# Patient Record
Sex: MALE | Race: BLACK OR AFRICAN AMERICAN | NOT HISPANIC OR LATINO
[De-identification: names, ages, dates, MRNs, and addresses within clinical notes are randomized per-mention and may not be internally consistent; named-entity substitution may affect disease eponyms.]

---

## 2017-05-03 ENCOUNTER — RESULT REVIEW (OUTPATIENT)
Age: 67
End: 2017-05-03

## 2018-04-04 ENCOUNTER — EMERGENCY (EMERGENCY)
Facility: HOSPITAL | Age: 68
LOS: 1 days | Discharge: HOME | End: 2018-04-04
Attending: EMERGENCY MEDICINE
Payer: MEDICARE

## 2018-04-04 VITALS
DIASTOLIC BLOOD PRESSURE: 77 MMHG | HEART RATE: 60 BPM | TEMPERATURE: 98 F | SYSTOLIC BLOOD PRESSURE: 147 MMHG | OXYGEN SATURATION: 100 % | RESPIRATION RATE: 18 BRPM

## 2018-04-04 VITALS — HEIGHT: 62 IN | WEIGHT: 162.04 LBS

## 2018-04-04 DIAGNOSIS — T88.7XXA UNSPECIFIED ADVERSE EFFECT OF DRUG OR MEDICAMENT, INITIAL ENCOUNTER: ICD-10-CM

## 2018-04-04 DIAGNOSIS — F29 UNSPECIFIED PSYCHOSIS NOT DUE TO A SUBSTANCE OR KNOWN PHYSIOLOGICAL CONDITION: ICD-10-CM

## 2018-04-04 LAB
ALBUMIN SERPL ELPH-MCNC: 4.5 G/DL — SIGNIFICANT CHANGE UP (ref 3.3–5.2)
ALP SERPL-CCNC: 73 U/L — SIGNIFICANT CHANGE UP (ref 40–120)
ALT FLD-CCNC: 7 U/L — SIGNIFICANT CHANGE UP
AMPHET UR-MCNC: NEGATIVE — SIGNIFICANT CHANGE UP
ANION GAP SERPL CALC-SCNC: 11 MMOL/L — SIGNIFICANT CHANGE UP (ref 5–17)
APAP SERPL-MCNC: <7.5 UG/ML — LOW (ref 10–26)
APPEARANCE UR: CLEAR — SIGNIFICANT CHANGE UP
AST SERPL-CCNC: 13 U/L — SIGNIFICANT CHANGE UP
BACTERIA # UR AUTO: ABNORMAL
BARBITURATES UR SCN-MCNC: NEGATIVE — SIGNIFICANT CHANGE UP
BASOPHILS # BLD AUTO: 0 K/UL — SIGNIFICANT CHANGE UP (ref 0–0.2)
BASOPHILS NFR BLD AUTO: 0.2 % — SIGNIFICANT CHANGE UP (ref 0–2)
BENZODIAZ UR-MCNC: NEGATIVE — SIGNIFICANT CHANGE UP
BILIRUB SERPL-MCNC: 0.8 MG/DL — SIGNIFICANT CHANGE UP (ref 0.4–2)
BILIRUB UR-MCNC: NEGATIVE — SIGNIFICANT CHANGE UP
BUN SERPL-MCNC: 10 MG/DL — SIGNIFICANT CHANGE UP (ref 8–20)
CALCIUM SERPL-MCNC: 9.2 MG/DL — SIGNIFICANT CHANGE UP (ref 8.6–10.2)
CHLORIDE SERPL-SCNC: 102 MMOL/L — SIGNIFICANT CHANGE UP (ref 98–107)
CO2 SERPL-SCNC: 28 MMOL/L — SIGNIFICANT CHANGE UP (ref 22–29)
COCAINE METAB.OTHER UR-MCNC: NEGATIVE — SIGNIFICANT CHANGE UP
COLOR SPEC: YELLOW — SIGNIFICANT CHANGE UP
CREAT SERPL-MCNC: 0.79 MG/DL — SIGNIFICANT CHANGE UP (ref 0.5–1.3)
DIFF PNL FLD: ABNORMAL
EOSINOPHIL # BLD AUTO: 0.2 K/UL — SIGNIFICANT CHANGE UP (ref 0–0.5)
EOSINOPHIL NFR BLD AUTO: 4.8 % — SIGNIFICANT CHANGE UP (ref 0–5)
ETHANOL SERPL-MCNC: <10 MG/DL — SIGNIFICANT CHANGE UP
GLUCOSE SERPL-MCNC: 92 MG/DL — SIGNIFICANT CHANGE UP (ref 70–115)
GLUCOSE UR QL: NEGATIVE MG/DL — SIGNIFICANT CHANGE UP
HCT VFR BLD CALC: 42.3 % — SIGNIFICANT CHANGE UP (ref 42–52)
HGB BLD-MCNC: 13.7 G/DL — LOW (ref 14–18)
KETONES UR-MCNC: NEGATIVE — SIGNIFICANT CHANGE UP
LEUKOCYTE ESTERASE UR-ACNC: NEGATIVE — SIGNIFICANT CHANGE UP
LYMPHOCYTES # BLD AUTO: 1 K/UL — SIGNIFICANT CHANGE UP (ref 1–4.8)
LYMPHOCYTES # BLD AUTO: 24.9 % — SIGNIFICANT CHANGE UP (ref 20–55)
MCHC RBC-ENTMCNC: 31.6 PG — HIGH (ref 27–31)
MCHC RBC-ENTMCNC: 32.4 G/DL — SIGNIFICANT CHANGE UP (ref 32–36)
MCV RBC AUTO: 97.7 FL — HIGH (ref 80–94)
METHADONE UR-MCNC: NEGATIVE — SIGNIFICANT CHANGE UP
MONOCYTES # BLD AUTO: 0.4 K/UL — SIGNIFICANT CHANGE UP (ref 0–0.8)
MONOCYTES NFR BLD AUTO: 9.4 % — SIGNIFICANT CHANGE UP (ref 3–10)
NEUTROPHILS # BLD AUTO: 2.5 K/UL — SIGNIFICANT CHANGE UP (ref 1.8–8)
NEUTROPHILS NFR BLD AUTO: 60.7 % — SIGNIFICANT CHANGE UP (ref 37–73)
NITRITE UR-MCNC: NEGATIVE — SIGNIFICANT CHANGE UP
OPIATES UR-MCNC: NEGATIVE — SIGNIFICANT CHANGE UP
PCP SPEC-MCNC: SIGNIFICANT CHANGE UP
PCP UR-MCNC: NEGATIVE — SIGNIFICANT CHANGE UP
PH UR: 6.5 — SIGNIFICANT CHANGE UP (ref 5–8)
PLATELET # BLD AUTO: 180 K/UL — SIGNIFICANT CHANGE UP (ref 150–400)
POTASSIUM SERPL-MCNC: 4.3 MMOL/L — SIGNIFICANT CHANGE UP (ref 3.5–5.3)
POTASSIUM SERPL-SCNC: 4.3 MMOL/L — SIGNIFICANT CHANGE UP (ref 3.5–5.3)
PROT SERPL-MCNC: 7 G/DL — SIGNIFICANT CHANGE UP (ref 6.6–8.7)
PROT UR-MCNC: 15 MG/DL
RBC # BLD: 4.33 M/UL — LOW (ref 4.6–6.2)
RBC # FLD: 12.2 % — SIGNIFICANT CHANGE UP (ref 11–15.6)
RBC CASTS # UR COMP ASSIST: SIGNIFICANT CHANGE UP /HPF (ref 0–4)
SALICYLATES SERPL-MCNC: <0.6 MG/DL — LOW (ref 10–20)
SODIUM SERPL-SCNC: 141 MMOL/L — SIGNIFICANT CHANGE UP (ref 135–145)
SP GR SPEC: 1.01 — SIGNIFICANT CHANGE UP (ref 1.01–1.02)
THC UR QL: NEGATIVE — SIGNIFICANT CHANGE UP
TSH SERPL-MCNC: 0.44 UIU/ML — SIGNIFICANT CHANGE UP (ref 0.27–4.2)
UROBILINOGEN FLD QL: 1 MG/DL
WBC # BLD: 4.2 K/UL — LOW (ref 4.8–10.8)
WBC # FLD AUTO: 4.2 K/UL — LOW (ref 4.8–10.8)

## 2018-04-04 PROCEDURE — 96374 THER/PROPH/DIAG INJ IV PUSH: CPT

## 2018-04-04 PROCEDURE — 85027 COMPLETE CBC AUTOMATED: CPT

## 2018-04-04 PROCEDURE — 84443 ASSAY THYROID STIM HORMONE: CPT

## 2018-04-04 PROCEDURE — 80053 COMPREHEN METABOLIC PANEL: CPT

## 2018-04-04 PROCEDURE — 99284 EMERGENCY DEPT VISIT MOD MDM: CPT

## 2018-04-04 PROCEDURE — 70450 CT HEAD/BRAIN W/O DYE: CPT

## 2018-04-04 PROCEDURE — 81001 URINALYSIS AUTO W/SCOPE: CPT

## 2018-04-04 PROCEDURE — 90792 PSYCH DIAG EVAL W/MED SRVCS: CPT

## 2018-04-04 PROCEDURE — 80307 DRUG TEST PRSMV CHEM ANLYZR: CPT

## 2018-04-04 PROCEDURE — 36415 COLL VENOUS BLD VENIPUNCTURE: CPT

## 2018-04-04 PROCEDURE — 96376 TX/PRO/DX INJ SAME DRUG ADON: CPT

## 2018-04-04 PROCEDURE — 70450 CT HEAD/BRAIN W/O DYE: CPT | Mod: 26

## 2018-04-04 PROCEDURE — 99284 EMERGENCY DEPT VISIT MOD MDM: CPT | Mod: 25

## 2018-04-04 RX ORDER — LOSARTAN POTASSIUM 100 MG/1
25 TABLET, FILM COATED ORAL DAILY
Qty: 0 | Refills: 0 | Status: DISCONTINUED | OUTPATIENT
Start: 2018-04-04 | End: 2018-04-04

## 2018-04-04 RX ORDER — AMLODIPINE BESYLATE 2.5 MG/1
10 TABLET ORAL ONCE
Qty: 0 | Refills: 0 | Status: COMPLETED | OUTPATIENT
Start: 2018-04-04 | End: 2018-04-04

## 2018-04-04 RX ORDER — HYDROCHLOROTHIAZIDE 25 MG
25 TABLET ORAL ONCE
Qty: 0 | Refills: 0 | Status: COMPLETED | OUTPATIENT
Start: 2018-04-04 | End: 2018-04-04

## 2018-04-04 RX ORDER — LABETALOL HCL 100 MG
10 TABLET ORAL ONCE
Qty: 0 | Refills: 0 | Status: COMPLETED | OUTPATIENT
Start: 2018-04-04 | End: 2018-04-04

## 2018-04-04 RX ORDER — QUETIAPINE FUMARATE 200 MG/1
0.5 TABLET, FILM COATED ORAL
Qty: 10 | Refills: 0 | OUTPATIENT
Start: 2018-04-04 | End: 2018-04-13

## 2018-04-04 RX ORDER — LOSARTAN POTASSIUM 100 MG/1
100 TABLET, FILM COATED ORAL ONCE
Qty: 0 | Refills: 0 | Status: COMPLETED | OUTPATIENT
Start: 2018-04-04 | End: 2018-04-04

## 2018-04-04 RX ORDER — LISINOPRIL 2.5 MG/1
20 TABLET ORAL ONCE
Qty: 0 | Refills: 0 | Status: COMPLETED | OUTPATIENT
Start: 2018-04-04 | End: 2018-04-04

## 2018-04-04 RX ADMIN — AMLODIPINE BESYLATE 10 MILLIGRAM(S): 2.5 TABLET ORAL at 10:42

## 2018-04-04 RX ADMIN — Medication 10 MILLIGRAM(S): at 10:05

## 2018-04-04 RX ADMIN — LISINOPRIL 20 MILLIGRAM(S): 2.5 TABLET ORAL at 10:42

## 2018-04-04 RX ADMIN — Medication 10 MILLIGRAM(S): at 09:27

## 2018-04-04 RX ADMIN — LOSARTAN POTASSIUM 25 MILLIGRAM(S): 100 TABLET, FILM COATED ORAL at 12:16

## 2018-04-04 RX ADMIN — Medication 25 MILLIGRAM(S): at 12:15

## 2018-04-04 RX ADMIN — LOSARTAN POTASSIUM 100 MILLIGRAM(S): 100 TABLET, FILM COATED ORAL at 09:28

## 2018-04-04 NOTE — ED ADULT NURSE NOTE - OBJECTIVE STATEMENT
Pt brought to ED by family for visual hallucinations. As per wife, pt has hx of parkinson's disease and does have hallucinations at baseline but wife states his hallucinations have worsened. Per wife,  pt is now seeing people in the house, pt ask wife "where is my machete?"  Pt denies SI/HI wife and daughter with pt.  Pt is A & OX4 and is calm at this time. Respirations are even & unlabored.

## 2018-04-04 NOTE — ED BEHAVIORAL HEALTH ASSESSMENT NOTE - DESCRIPTION (FIRST USE, LAST USE, QUANTITY, FREQUENCY, DURATION)
Drinks occasionally socially intermittedn marijuana use--last time was 3 months ago.  Stopped smoking for 30 years while working.  First used age 14

## 2018-04-04 NOTE — ED BEHAVIORAL HEALTH ASSESSMENT NOTE - RISK ASSESSMENT
Concerning patient's suicide risk, I feel the patient's current risk is low, , as evidenced by  denial of immediate ideation or intent, no history of suicide attempts, no current drinking, a sincere interest in getting help and the genuineness of patients safety anthony without demonstrable ambivalence, patient has good support and remains future oriented. Family feels safe with patient at home.  Patient visual hallucinations and potential for worsening course increase in long term risk.

## 2018-04-04 NOTE — ED PROVIDER NOTE - NEUROLOGICAL, MLM
Alert and oriented, no focal deficits, no motor or sensory deficits Alert and oriented X 3, no focal deficits, no motor or sensory deficits

## 2018-04-04 NOTE — ED ADULT TRIAGE NOTE - CHIEF COMPLAINT QUOTE
pt presents to ED with visual hallucinations. as per wife, pt has hx of parkinson's disease and has hallucinations at baseline but has got worse. pt is now seeing people in the house, pt ask wife "where is my machete?" denies SI. wife with pt. pt calm at this time.

## 2018-04-04 NOTE — ED PROVIDER NOTE - OBJECTIVE STATEMENT
66 y/o M pt with hx of Parkinson's, HTN and thyroidectomy presents to ED with wife and daughter c/o worsening visual hallucinations. Pt states he is seeing a man in his house and his daughter reports he has been asking his machete.  Pt's family states he has been having hallucinations for the past 6 months, but has worsening over the last month. His neurologist increased his Sinemet to 5 times per day. When family presented medications, he is also on generic form of sinemet once a day. Daughter states pt has not been sleeping and has been yelling at his wife during these episodes of hallucinations. Denies SI/HI, CP, SOB, fever, chills, nausea and vomiting. No further complaints at this time.

## 2018-04-04 NOTE — ED BEHAVIORAL HEALTH ASSESSMENT NOTE - HPI (INCLUDE ILLNESS QUALITY, SEVERITY, DURATION, TIMING, CONTEXT, MODIFYING FACTORS, ASSOCIATED SIGNS AND SYMPTOMS)
Patient is a 67 year old, female; domiciled with wife of many years and youngest daughter; noncaregiver; retired with no significant past psychiatric history; no psychiatric  hospitalizations; no known suicide attempts; no known history of violence or arrests; no active substance abuse or known history of complicated withdrawal; PMH of Parkinsons disease; brought in by family after having hallucinations this morning.      Patient has been having worsening hallucinations for the last couple of months.  Patient's sinemet has also been increased in January.   Patient denies any other stressors, other than hallucinations.  He characterizes his relationship with wife and family as "good".  Today patient woke up feeling as if there was an intruder in his room that wanted to stab him with a knife.  Patient yelled for his wife who reassured him that he was having a hallucination.  Patient admits that he has had similar episodes recently.  He alternatively agreed that it was a hallucination and or believed that his house was being invaded by spirits.  He has not been aggressive or violent to anyone.  He reports that spirits make him feel depressed.  But denies consistently depressed mood. He has othewise been sleeping, eating and concentration has been good.  He is AOx3.  He knows that he is in [facility] and that Santosh Lozoya in president.  Family noted that patient was angry this am, but he is currently doing well. Patient has never engaged in any dangerous or impulsive behavior and they feel safe with pat at home. Family has reportedly discussed matter with neurologist who did not change medications last time he saw patient, but family feel his symptoms are worse since last visit.       Concerning other psychiatric symptoms, pt denies ddenies any suicidal ideation, intent or plan as well as any aggressive or violent behavior towards others. Pt denies any episodes of bizarre happiness, unusual energy, unusual nightime excitation or other common symptoms of be. Pt denies hearing voices.  Patient admits that spirits make him anxious but otherwise denies any  panic attacks, obsessions or compulsions.      Family are at bedside. The corrobate history gathered above and feel safe with patient at home.  Daughter reports that she had discussed possiblity of medication induced hallucinations and possible addition of neuplazid with neurologist but that neurologist did not make any changes during last visit.  No overt personality change or excessive cognitive decline were noted by family.  Support and psychoeducation was discussed with family.

## 2018-04-04 NOTE — ED BEHAVIORAL HEALTH ASSESSMENT NOTE - OTHER PAST PSYCHIATRIC HISTORY (INCLUDE DETAILS REGARDING ONSET, COURSE OF ILLNESS, INPATIENT/OUTPATIENT TREATMENT)
Pt denies any prior history of formal psychiatric treatment.  He has no prior inpatient hospitalization.  No prior h/o outpatient treatment and no h/o psychotropic medications.  No prior suicide attempts.

## 2018-04-04 NOTE — ED BEHAVIORAL HEALTH ASSESSMENT NOTE - SUMMARY
Patient with visual hallucinations likely induced by medications (increase in sinemet)  Patient does not have criteria for major depression or be, denies any S/H I/I/P. Patient has not been aggressive or violent and family feel safe with patient at home. Pt's condition does not warrant inpatient hospitalization.  Would follow up and coordinate care with neurologist. Would recommend decreasing dosage of sinemet as possible. Would consider addition of Pimavanserin to target psychotic sx's if this is not possible. Both of these can be done as outpatient

## 2018-04-04 NOTE — ED BEHAVIORAL HEALTH ASSESSMENT NOTE - DESCRIPTION
Patient was laying in bed, calm, cooperative and no acute distress.  Patient did have coarse "pill rolling" tremor.      Vital Signs Last 24 Hrs  T(C): 37.1 (04 Apr 2018 11:22), Max: 37.1 (04 Apr 2018 11:22)  T(F): 98.8 (04 Apr 2018 11:22), Max: 98.8 (04 Apr 2018 11:22)  HR: 60 (04 Apr 2018 13:12) (60 - 85)  BP: 186/85 (04 Apr 2018 13:12) (186/85 - 226/137)  BP(mean): --  RR: 17 (04 Apr 2018 13:12) (17 - 18)  SpO2: 98% (04 Apr 2018 13:12) (98% - 100%) Parkinsons Worked 30 years in [employer].  Has been  50 years.  He has two daughters.  Currently retired

## 2018-04-04 NOTE — ED PROVIDER NOTE - MEDICAL DECISION MAKING DETAILS
Pt currently aaox3, well appearing, lucid and no hallucinations. Discussed case with both psych as well as with neuro Dr. Flood. PHe advises that sinemet is morderate dose and will see pt in office wihtin next few days to adjust. No precipitating factors identified here in ED - no infection, dehydration, anticholinergic meds. Family is agreeable with dc. Will give low dose seroquel for hallucinations until seen by neuro. Stable for dc

## 2018-04-04 NOTE — ED PROVIDER NOTE - CARE PLAN
Principal Discharge DX:	Parkinson disease  Secondary Diagnosis:	Psychosis, unspecified psychosis type

## 2018-04-04 NOTE — ED ADULT NURSE REASSESSMENT NOTE - NS ED NURSE REASSESS COMMENT FT1
Pt ambulatory to the bathroom.
Pt medicated for HBP as per MD orders, pt tolerated well, family remains at bedside.
Pt returned from CT, BP remains high, MD made aware.
Psych MD at bedside

## 2018-04-28 ENCOUNTER — INPATIENT (INPATIENT)
Facility: HOSPITAL | Age: 68
LOS: 4 days | Discharge: NURSING, REHAB OR LONG-TERM CARE | DRG: 683 | End: 2018-05-03
Attending: INTERNAL MEDICINE | Admitting: INTERNAL MEDICINE
Payer: MEDICARE

## 2018-04-28 VITALS
TEMPERATURE: 98 F | DIASTOLIC BLOOD PRESSURE: 53 MMHG | OXYGEN SATURATION: 97 % | HEIGHT: 66 IN | HEART RATE: 90 BPM | RESPIRATION RATE: 18 BRPM | WEIGHT: 160.06 LBS | SYSTOLIC BLOOD PRESSURE: 86 MMHG

## 2018-04-28 DIAGNOSIS — N17.9 ACUTE KIDNEY FAILURE, UNSPECIFIED: ICD-10-CM

## 2018-04-28 LAB
ALBUMIN SERPL ELPH-MCNC: 4 G/DL — SIGNIFICANT CHANGE UP (ref 3.3–5.2)
ALP SERPL-CCNC: 62 U/L — SIGNIFICANT CHANGE UP (ref 40–120)
ALT FLD-CCNC: 19 U/L — SIGNIFICANT CHANGE UP
ANION GAP SERPL CALC-SCNC: 14 MMOL/L — SIGNIFICANT CHANGE UP (ref 5–17)
ANISOCYTOSIS BLD QL: SLIGHT — SIGNIFICANT CHANGE UP
APPEARANCE UR: CLEAR — SIGNIFICANT CHANGE UP
AST SERPL-CCNC: 303 U/L — HIGH
BACTERIA # UR AUTO: ABNORMAL
BILIRUB SERPL-MCNC: 1 MG/DL — SIGNIFICANT CHANGE UP (ref 0.4–2)
BILIRUB UR-MCNC: NEGATIVE — SIGNIFICANT CHANGE UP
BUN SERPL-MCNC: 34 MG/DL — HIGH (ref 8–20)
CALCIUM SERPL-MCNC: 8.8 MG/DL — SIGNIFICANT CHANGE UP (ref 8.6–10.2)
CHLORIDE SERPL-SCNC: 101 MMOL/L — SIGNIFICANT CHANGE UP (ref 98–107)
CO2 SERPL-SCNC: 27 MMOL/L — SIGNIFICANT CHANGE UP (ref 22–29)
COLOR SPEC: YELLOW — SIGNIFICANT CHANGE UP
CREAT SERPL-MCNC: 3.29 MG/DL — HIGH (ref 0.5–1.3)
DIFF PNL FLD: ABNORMAL
EPI CELLS # UR: SIGNIFICANT CHANGE UP
GLUCOSE SERPL-MCNC: 90 MG/DL — SIGNIFICANT CHANGE UP (ref 70–115)
GLUCOSE UR QL: 50 MG/DL
HCT VFR BLD CALC: 43.6 % — SIGNIFICANT CHANGE UP (ref 42–52)
HGB BLD-MCNC: 14.5 G/DL — SIGNIFICANT CHANGE UP (ref 14–18)
HYPOCHROMIA BLD QL: SLIGHT — SIGNIFICANT CHANGE UP
KETONES UR-MCNC: ABNORMAL
LEUKOCYTE ESTERASE UR-ACNC: ABNORMAL
LYMPHOCYTES # BLD AUTO: 12 % — LOW (ref 20–55)
MACROCYTES BLD QL: SLIGHT — SIGNIFICANT CHANGE UP
MCHC RBC-ENTMCNC: 32.3 PG — HIGH (ref 27–31)
MCHC RBC-ENTMCNC: 33.3 G/DL — SIGNIFICANT CHANGE UP (ref 32–36)
MCV RBC AUTO: 97.1 FL — HIGH (ref 80–94)
MICROCYTES BLD QL: SLIGHT — SIGNIFICANT CHANGE UP
MONOCYTES NFR BLD AUTO: 8 % — SIGNIFICANT CHANGE UP (ref 3–10)
NEUTROPHILS NFR BLD AUTO: 76 % — HIGH (ref 37–73)
NITRITE UR-MCNC: NEGATIVE — SIGNIFICANT CHANGE UP
PH UR: 7 — SIGNIFICANT CHANGE UP (ref 5–8)
PLAT MORPH BLD: NORMAL — SIGNIFICANT CHANGE UP
PLATELET # BLD AUTO: 173 K/UL — SIGNIFICANT CHANGE UP (ref 150–400)
POIKILOCYTOSIS BLD QL AUTO: SLIGHT — SIGNIFICANT CHANGE UP
POTASSIUM SERPL-MCNC: 4.4 MMOL/L — SIGNIFICANT CHANGE UP (ref 3.5–5.3)
POTASSIUM SERPL-SCNC: 4.4 MMOL/L — SIGNIFICANT CHANGE UP (ref 3.5–5.3)
PROT SERPL-MCNC: 6.8 G/DL — SIGNIFICANT CHANGE UP (ref 6.6–8.7)
PROT UR-MCNC: 100 MG/DL
RBC # BLD: 4.49 M/UL — LOW (ref 4.6–6.2)
RBC # FLD: 12.3 % — SIGNIFICANT CHANGE UP (ref 11–15.6)
RBC BLD AUTO: ABNORMAL
RBC CASTS # UR COMP ASSIST: >50 /HPF (ref 0–4)
SODIUM SERPL-SCNC: 142 MMOL/L — SIGNIFICANT CHANGE UP (ref 135–145)
SP GR SPEC: 1.01 — SIGNIFICANT CHANGE UP (ref 1.01–1.02)
UROBILINOGEN FLD QL: 1 MG/DL
VARIANT LYMPHS # BLD: 4 % — SIGNIFICANT CHANGE UP (ref 0–6)
WBC # BLD: 11.7 K/UL — HIGH (ref 4.8–10.8)
WBC # FLD AUTO: 11.7 K/UL — HIGH (ref 4.8–10.8)
WBC UR QL: >50

## 2018-04-28 PROCEDURE — 93971 EXTREMITY STUDY: CPT | Mod: 26,RT

## 2018-04-28 PROCEDURE — 99285 EMERGENCY DEPT VISIT HI MDM: CPT

## 2018-04-28 PROCEDURE — 70486 CT MAXILLOFACIAL W/O DYE: CPT | Mod: 26

## 2018-04-28 PROCEDURE — 70450 CT HEAD/BRAIN W/O DYE: CPT | Mod: 26

## 2018-04-28 PROCEDURE — 76775 US EXAM ABDO BACK WALL LIM: CPT | Mod: 26

## 2018-04-28 PROCEDURE — 99223 1ST HOSP IP/OBS HIGH 75: CPT | Mod: AI

## 2018-04-28 RX ORDER — CARBIDOPA AND LEVODOPA 25; 100 MG/1; MG/1
1 TABLET ORAL THREE TIMES A DAY
Qty: 0 | Refills: 0 | Status: DISCONTINUED | OUTPATIENT
Start: 2018-04-28 | End: 2018-05-03

## 2018-04-28 RX ORDER — SODIUM CHLORIDE 9 MG/ML
3 INJECTION INTRAMUSCULAR; INTRAVENOUS; SUBCUTANEOUS ONCE
Qty: 0 | Refills: 0 | Status: COMPLETED | OUTPATIENT
Start: 2018-04-28 | End: 2018-04-28

## 2018-04-28 RX ORDER — PIMAVANSERIN TARTRATE 10 MG/1
34 TABLET, COATED ORAL DAILY
Qty: 0 | Refills: 0 | Status: DISCONTINUED | OUTPATIENT
Start: 2018-04-28 | End: 2018-05-03

## 2018-04-28 RX ORDER — SODIUM CHLORIDE 9 MG/ML
500 INJECTION INTRAMUSCULAR; INTRAVENOUS; SUBCUTANEOUS ONCE
Qty: 0 | Refills: 0 | Status: COMPLETED | OUTPATIENT
Start: 2018-04-28 | End: 2018-04-28

## 2018-04-28 RX ORDER — TAMSULOSIN HYDROCHLORIDE 0.4 MG/1
0.8 CAPSULE ORAL AT BEDTIME
Qty: 0 | Refills: 0 | Status: DISCONTINUED | OUTPATIENT
Start: 2018-04-28 | End: 2018-05-03

## 2018-04-28 RX ORDER — MIRABEGRON 50 MG/1
25 TABLET, EXTENDED RELEASE ORAL
Qty: 0 | Refills: 0 | Status: DISCONTINUED | OUTPATIENT
Start: 2018-04-28 | End: 2018-05-01

## 2018-04-28 RX ORDER — HEPARIN SODIUM 5000 [USP'U]/ML
5000 INJECTION INTRAVENOUS; SUBCUTANEOUS EVERY 12 HOURS
Qty: 0 | Refills: 0 | Status: DISCONTINUED | OUTPATIENT
Start: 2018-04-28 | End: 2018-05-03

## 2018-04-28 RX ORDER — FINASTERIDE 5 MG/1
5 TABLET, FILM COATED ORAL DAILY
Qty: 0 | Refills: 0 | Status: DISCONTINUED | OUTPATIENT
Start: 2018-04-28 | End: 2018-05-03

## 2018-04-28 RX ORDER — SODIUM CHLORIDE 9 MG/ML
1000 INJECTION INTRAMUSCULAR; INTRAVENOUS; SUBCUTANEOUS
Qty: 0 | Refills: 0 | Status: DISCONTINUED | OUTPATIENT
Start: 2018-04-28 | End: 2018-04-28

## 2018-04-28 RX ORDER — SODIUM CHLORIDE 9 MG/ML
1000 INJECTION INTRAMUSCULAR; INTRAVENOUS; SUBCUTANEOUS
Qty: 0 | Refills: 0 | Status: DISCONTINUED | OUTPATIENT
Start: 2018-04-28 | End: 2018-05-03

## 2018-04-28 RX ORDER — SODIUM CHLORIDE 9 MG/ML
1000 INJECTION INTRAMUSCULAR; INTRAVENOUS; SUBCUTANEOUS ONCE
Qty: 0 | Refills: 0 | Status: COMPLETED | OUTPATIENT
Start: 2018-04-28 | End: 2018-04-28

## 2018-04-28 RX ADMIN — SODIUM CHLORIDE 2000 MILLILITER(S): 9 INJECTION INTRAMUSCULAR; INTRAVENOUS; SUBCUTANEOUS at 11:02

## 2018-04-28 RX ADMIN — TAMSULOSIN HYDROCHLORIDE 0.8 MILLIGRAM(S): 0.4 CAPSULE ORAL at 21:15

## 2018-04-28 RX ADMIN — CARBIDOPA AND LEVODOPA 1 TABLET(S): 25; 100 TABLET ORAL at 21:15

## 2018-04-28 RX ADMIN — SODIUM CHLORIDE 1000 MILLILITER(S): 9 INJECTION INTRAMUSCULAR; INTRAVENOUS; SUBCUTANEOUS at 07:05

## 2018-04-28 RX ADMIN — SODIUM CHLORIDE 125 MILLILITER(S): 9 INJECTION INTRAMUSCULAR; INTRAVENOUS; SUBCUTANEOUS at 14:32

## 2018-04-28 RX ADMIN — PIMAVANSERIN TARTRATE 34 MILLIGRAM(S): 10 TABLET, COATED ORAL at 21:14

## 2018-04-28 RX ADMIN — SODIUM CHLORIDE 3 MILLILITER(S): 9 INJECTION INTRAMUSCULAR; INTRAVENOUS; SUBCUTANEOUS at 06:20

## 2018-04-28 RX ADMIN — CARBIDOPA AND LEVODOPA 1 TABLET(S): 25; 100 TABLET ORAL at 14:30

## 2018-04-28 RX ADMIN — FINASTERIDE 5 MILLIGRAM(S): 5 TABLET, FILM COATED ORAL at 14:30

## 2018-04-28 RX ADMIN — SODIUM CHLORIDE 125 MILLILITER(S): 9 INJECTION INTRAMUSCULAR; INTRAVENOUS; SUBCUTANEOUS at 21:16

## 2018-04-28 NOTE — ED ADULT TRIAGE NOTE - CHIEF COMPLAINT QUOTE
patient biba from home states that he has swollen legs and has not been able to walk since yesterday, wife states that patient was found on the floor yesterday by his wife, she states that he was stiff. patient denies any other complaints of pain but states that he does have pain to his right eye, swollen and tearing. Wife is unsure if he hit his eye when he fell yesterday. Denies being on any blood thinners at this time patient has a history of parkinsons, denies feeling dizzy patient biba from home states that he has swollen legs and has not been able to walk since yesterday, wife states that patient was found on the floor yesterday by his wife, she states that he was stiff. patient able to minimally move legs, pos sensory and pulses. patient denies any other complaints of pain but states that he does have pain to his right eye, swollen and tearing. Wife is unsure if he hit his eye when he fell yesterday. Denies being on any blood thinners at this time patient has a history of parkinsons, denies feeling dizzy

## 2018-04-28 NOTE — H&P ADULT - HISTORY OF PRESENT ILLNESS
The patient is a 67 year old male with a history of Parkinson's disease, hypertension and bph who was brought to the ER for weakness and a fall. According to the patient and his wife at the bedside. The patient is his usual state of health until 2 days ago. He tried to get out of bed at night to use the bathroom but fell over the side of the bed. He was unable to get up on his own and lay there all night. The next morning his wife awoke to find him curled up in a ball. He was awake but very weak complaining ofbilateral leg pain. She was unable to get him up on her own and waited for her daughter. Since then he has felt very weak with poor oral intake. In the ER he was noted to have naga and rhabdomyolysis.

## 2018-04-28 NOTE — ED PROVIDER NOTE - PROGRESS NOTE DETAILS
Ever: received sign out, patient noted with new acute renal failure with urinary retention (500cc on bladder scan); no hydro on renal US; thrasher placed, urine sent, grossly appears very tea colored; on further questioning, wife unsure how long he was on ground when he fell yesterday; added on CK to previous labs, noted to be elevated; renal failure with rhabdo, will admit to hospitalist

## 2018-04-28 NOTE — H&P ADULT - ASSESSMENT
The patient is a 67 year old male with a history of Parkinson's disease, hypertension and bph who was brought to the ER for weakness and a fall.  Admitted for acute renal failure and rhabdomyolysis.     Assessment/Plan:    1. DOMONIQUE: Baseline creatine normal in 04/2018- 0.79; Renal ultrasound with no hydronephrosis. Ramos placed fo i&Os.   Monitor bmp closely  Aggressive IV hydration    2. Rhabdomyolosis from fall causing lower extremity pain/cramping: IV hydration. monitor ck    3. Hypertension: hold cozaar for domonique and hypovolemia  Continue Myrbetric    4. Parkinson's disease; progressively worsening over the past few months. Continue Sinemet  On Nyplazid for hallucinations    Pt evaluation- [place] vs home with home care    VTE_ heparin subcut    Plan discused in detail with patient;s daughter and wife at the bedside

## 2018-04-28 NOTE — ED ADULT NURSE NOTE - CHIEF COMPLAINT QUOTE
patient biba from home states that he has swollen legs and has not been able to walk since yesterday, wife states that patient was found on the floor yesterday by his wife, she states that he was stiff. patient able to minimally move legs, pos sensory and pulses. patient denies any other complaints of pain but states that he does have pain to his right eye, swollen and tearing. Wife is unsure if he hit his eye when he fell yesterday. Denies being on any blood thinners at this time patient has a history of parkinsons, denies feeling dizzy

## 2018-04-28 NOTE — ED ADULT NURSE NOTE - OBJECTIVE STATEMENT
patient biba from home states that he has swollen legs and has not been able to walk since yesterday, wife states that patient was found on the floor yesterday by his wife, she states that he was stiff. patient able to minimally move legs, pos sensory and pulses. patient denies any other complaints of pain but states that he does have pain to his right eye, red, swollen and tearing. Wife is unsure if he hit his eye when he fell yesterday. Denies being on any blood thinners at this time patient has a history of parkinsons, denies feeling dizzy

## 2018-04-28 NOTE — ED PROVIDER NOTE - CONSTITUTIONAL, MLM
normal... Elderly male awake, alert, oriented to person, place, time/situation and in no apparent distress.

## 2018-04-28 NOTE — ED PROVIDER NOTE - OBJECTIVE STATEMENT
68 yo M presents to ED via EMS with family c/o increased weakness x last few days.  Pt with falls yesterday and again this AM.  Pt found on floor by his wife after he awoke at 0300 to urinate and had urge to move bowels prior to falling.  Pt's wife noted pt's R eye to be red and tearing and concerned he hit his eye when he fell.  Pt with hx of Parkinson's x last 6 yrs.  Pt awake and alert and denies any LOC, neck pain, chest pain, SOB or abd pain.  No recent illness or fever.  Pt's wife noted foot and leg has been swollen

## 2018-04-29 LAB
ANION GAP SERPL CALC-SCNC: 9 MMOL/L — SIGNIFICANT CHANGE UP (ref 5–17)
BUN SERPL-MCNC: 28 MG/DL — HIGH (ref 8–20)
CALCIUM SERPL-MCNC: 8.4 MG/DL — LOW (ref 8.6–10.2)
CHLORIDE SERPL-SCNC: 108 MMOL/L — HIGH (ref 98–107)
CK SERPL-CCNC: CRITICAL HIGH U/L (ref 30–200)
CO2 SERPL-SCNC: 26 MMOL/L — SIGNIFICANT CHANGE UP (ref 22–29)
CREAT SERPL-MCNC: 1.13 MG/DL — SIGNIFICANT CHANGE UP (ref 0.5–1.3)
CULTURE RESULTS: NO GROWTH — SIGNIFICANT CHANGE UP
GLUCOSE SERPL-MCNC: 87 MG/DL — SIGNIFICANT CHANGE UP (ref 70–115)
HCT VFR BLD CALC: 38.7 % — LOW (ref 42–52)
HGB BLD-MCNC: 12.6 G/DL — LOW (ref 14–18)
MCHC RBC-ENTMCNC: 31.7 PG — HIGH (ref 27–31)
MCHC RBC-ENTMCNC: 32.6 G/DL — SIGNIFICANT CHANGE UP (ref 32–36)
MCV RBC AUTO: 97.2 FL — HIGH (ref 80–94)
PLATELET # BLD AUTO: 140 K/UL — LOW (ref 150–400)
POTASSIUM SERPL-MCNC: 3.8 MMOL/L — SIGNIFICANT CHANGE UP (ref 3.5–5.3)
POTASSIUM SERPL-SCNC: 3.8 MMOL/L — SIGNIFICANT CHANGE UP (ref 3.5–5.3)
RBC # BLD: 3.98 M/UL — LOW (ref 4.6–6.2)
RBC # FLD: 12.2 % — SIGNIFICANT CHANGE UP (ref 11–15.6)
SODIUM SERPL-SCNC: 143 MMOL/L — SIGNIFICANT CHANGE UP (ref 135–145)
SPECIMEN SOURCE: SIGNIFICANT CHANGE UP
TROPONIN T SERPL-MCNC: <0.01 NG/ML — SIGNIFICANT CHANGE UP (ref 0–0.06)
WBC # BLD: 7.5 K/UL — SIGNIFICANT CHANGE UP (ref 4.8–10.8)
WBC # FLD AUTO: 7.5 K/UL — SIGNIFICANT CHANGE UP (ref 4.8–10.8)

## 2018-04-29 PROCEDURE — 93010 ELECTROCARDIOGRAM REPORT: CPT

## 2018-04-29 PROCEDURE — 99233 SBSQ HOSP IP/OBS HIGH 50: CPT

## 2018-04-29 RX ORDER — ACETAMINOPHEN 500 MG
325 TABLET ORAL EVERY 4 HOURS
Qty: 0 | Refills: 0 | Status: DISCONTINUED | OUTPATIENT
Start: 2018-04-29 | End: 2018-05-03

## 2018-04-29 RX ORDER — HYDRALAZINE HCL 50 MG
10 TABLET ORAL EVERY 8 HOURS
Qty: 0 | Refills: 0 | Status: DISCONTINUED | OUTPATIENT
Start: 2018-04-29 | End: 2018-05-03

## 2018-04-29 RX ADMIN — Medication 325 MILLIGRAM(S): at 14:24

## 2018-04-29 RX ADMIN — HEPARIN SODIUM 5000 UNIT(S): 5000 INJECTION INTRAVENOUS; SUBCUTANEOUS at 17:24

## 2018-04-29 RX ADMIN — HEPARIN SODIUM 5000 UNIT(S): 5000 INJECTION INTRAVENOUS; SUBCUTANEOUS at 05:25

## 2018-04-29 RX ADMIN — CARBIDOPA AND LEVODOPA 1 TABLET(S): 25; 100 TABLET ORAL at 23:27

## 2018-04-29 RX ADMIN — PIMAVANSERIN TARTRATE 34 MILLIGRAM(S): 10 TABLET, COATED ORAL at 11:16

## 2018-04-29 RX ADMIN — CARBIDOPA AND LEVODOPA 1 TABLET(S): 25; 100 TABLET ORAL at 13:00

## 2018-04-29 RX ADMIN — TAMSULOSIN HYDROCHLORIDE 0.8 MILLIGRAM(S): 0.4 CAPSULE ORAL at 23:13

## 2018-04-29 RX ADMIN — FINASTERIDE 5 MILLIGRAM(S): 5 TABLET, FILM COATED ORAL at 11:16

## 2018-04-29 RX ADMIN — Medication 10 MILLIGRAM(S): at 17:24

## 2018-04-29 RX ADMIN — SODIUM CHLORIDE 125 MILLILITER(S): 9 INJECTION INTRAMUSCULAR; INTRAVENOUS; SUBCUTANEOUS at 05:24

## 2018-04-29 RX ADMIN — CARBIDOPA AND LEVODOPA 1 TABLET(S): 25; 100 TABLET ORAL at 05:25

## 2018-04-29 RX ADMIN — Medication 325 MILLIGRAM(S): at 15:00

## 2018-04-29 NOTE — CHART NOTE - NSCHARTNOTEFT_GEN_A_CORE
Pt h/o BPH. thrasher dc/d this am. Pt unable to void since thrasher d/cd. Pt states thrasher was not easily inserted in er . Pt states a coude thrasher catheter had to be used. Pt states he feels uncomfortable. Bladder scan 450. RN unable to insert regular thrasher for straight cath. Discussed with hospitalist Dr Preciado. Because of difficulty with insertion and pt h/o rhabdomyolysis pt on IV fluids at 125cc hr. OK to reinsert thrasher catheter at this time. Pt already on flomax. Coude catheter easily inserted using aseptic technique. 300cc clear urine drained immediately. still draining. continue to monitor

## 2018-04-30 LAB
ANION GAP SERPL CALC-SCNC: 12 MMOL/L — SIGNIFICANT CHANGE UP (ref 5–17)
BUN SERPL-MCNC: 17 MG/DL — SIGNIFICANT CHANGE UP (ref 8–20)
CALCIUM SERPL-MCNC: 8.3 MG/DL — LOW (ref 8.6–10.2)
CHLORIDE SERPL-SCNC: 108 MMOL/L — HIGH (ref 98–107)
CK SERPL-CCNC: CRITICAL HIGH U/L (ref 30–200)
CO2 SERPL-SCNC: 25 MMOL/L — SIGNIFICANT CHANGE UP (ref 22–29)
CREAT SERPL-MCNC: 0.81 MG/DL — SIGNIFICANT CHANGE UP (ref 0.5–1.3)
GLUCOSE SERPL-MCNC: 90 MG/DL — SIGNIFICANT CHANGE UP (ref 70–115)
HCT VFR BLD CALC: 37.2 % — LOW (ref 42–52)
HGB BLD-MCNC: 12.4 G/DL — LOW (ref 14–18)
MCHC RBC-ENTMCNC: 32 PG — HIGH (ref 27–31)
MCHC RBC-ENTMCNC: 33.3 G/DL — SIGNIFICANT CHANGE UP (ref 32–36)
MCV RBC AUTO: 96.1 FL — HIGH (ref 80–94)
PLATELET # BLD AUTO: 154 K/UL — SIGNIFICANT CHANGE UP (ref 150–400)
POTASSIUM SERPL-MCNC: 4 MMOL/L — SIGNIFICANT CHANGE UP (ref 3.5–5.3)
POTASSIUM SERPL-SCNC: 4 MMOL/L — SIGNIFICANT CHANGE UP (ref 3.5–5.3)
RBC # BLD: 3.87 M/UL — LOW (ref 4.6–6.2)
RBC # FLD: 11.5 % — SIGNIFICANT CHANGE UP (ref 11–15.6)
SODIUM SERPL-SCNC: 145 MMOL/L — SIGNIFICANT CHANGE UP (ref 135–145)
TROPONIN T SERPL-MCNC: <0.01 NG/ML — SIGNIFICANT CHANGE UP (ref 0–0.06)
TROPONIN T SERPL-MCNC: <0.01 NG/ML — SIGNIFICANT CHANGE UP (ref 0–0.06)
WBC # BLD: 7.8 K/UL — SIGNIFICANT CHANGE UP (ref 4.8–10.8)
WBC # FLD AUTO: 7.8 K/UL — SIGNIFICANT CHANGE UP (ref 4.8–10.8)

## 2018-04-30 PROCEDURE — 99222 1ST HOSP IP/OBS MODERATE 55: CPT

## 2018-04-30 PROCEDURE — 99233 SBSQ HOSP IP/OBS HIGH 50: CPT

## 2018-04-30 RX ORDER — LOSARTAN POTASSIUM 100 MG/1
50 TABLET, FILM COATED ORAL DAILY
Qty: 0 | Refills: 0 | Status: DISCONTINUED | OUTPATIENT
Start: 2018-04-30 | End: 2018-05-01

## 2018-04-30 RX ORDER — HALOPERIDOL DECANOATE 100 MG/ML
2 INJECTION INTRAMUSCULAR ONCE
Qty: 0 | Refills: 0 | Status: COMPLETED | OUTPATIENT
Start: 2018-04-30 | End: 2018-04-30

## 2018-04-30 RX ORDER — HYDROCHLOROTHIAZIDE 25 MG
12.5 TABLET ORAL DAILY
Qty: 0 | Refills: 0 | Status: DISCONTINUED | OUTPATIENT
Start: 2018-04-30 | End: 2018-05-01

## 2018-04-30 RX ADMIN — CARBIDOPA AND LEVODOPA 1 TABLET(S): 25; 100 TABLET ORAL at 05:50

## 2018-04-30 RX ADMIN — LOSARTAN POTASSIUM 50 MILLIGRAM(S): 100 TABLET, FILM COATED ORAL at 13:23

## 2018-04-30 RX ADMIN — HALOPERIDOL DECANOATE 2 MILLIGRAM(S): 100 INJECTION INTRAMUSCULAR at 23:49

## 2018-04-30 RX ADMIN — HEPARIN SODIUM 5000 UNIT(S): 5000 INJECTION INTRAVENOUS; SUBCUTANEOUS at 05:50

## 2018-04-30 RX ADMIN — CARBIDOPA AND LEVODOPA 1 TABLET(S): 25; 100 TABLET ORAL at 15:12

## 2018-04-30 RX ADMIN — PIMAVANSERIN TARTRATE 34 MILLIGRAM(S): 10 TABLET, COATED ORAL at 13:24

## 2018-04-30 RX ADMIN — FINASTERIDE 5 MILLIGRAM(S): 5 TABLET, FILM COATED ORAL at 13:23

## 2018-04-30 RX ADMIN — Medication 12.5 MILLIGRAM(S): at 13:24

## 2018-04-30 RX ADMIN — HEPARIN SODIUM 5000 UNIT(S): 5000 INJECTION INTRAVENOUS; SUBCUTANEOUS at 18:26

## 2018-04-30 RX ADMIN — SODIUM CHLORIDE 125 MILLILITER(S): 9 INJECTION INTRAMUSCULAR; INTRAVENOUS; SUBCUTANEOUS at 13:23

## 2018-04-30 NOTE — CHART NOTE - NSCHARTNOTEFT_GEN_A_CORE
Called by Rn after pt is agitated and combative.  Pt with hx of parkinsons disease and on nuplazid for hallucination as per MD H&P.  As per Rn, pt's spouse on the phone stating pt gets agitated like this at times.  VSS.  Haldol 2 mg Im ordered considering safety.  Observe and reassess prn.

## 2018-04-30 NOTE — CONSULT NOTE ADULT - SUBJECTIVE AND OBJECTIVE BOX
ANNA FLORES  [id number]  67y, Male    Acute renal failure      Patient is a 67y old  Male who presents with a chief complaint of Fall (28 Apr 2018 13:47)      HPI:    I was asked to see this very pleasant gentleman for urinary retention.  he has had Parkinson's for about 6 years. He fell at home and required a catheter. His creatinine was elevated presumed due to rhabdomyolysis.  He has failed voiding trials.  Currently has a 12 Fr coude in place. prior to the fall he had a weak stream, nocturia X 3 and urinary frequency.  He denied hesitancy. Denied post void fullness. No prior  surgery.      Allergies    No Known Allergies    Intolerances        MEDICATIONS  (STANDING):  carbidopa/levodopa  25/100 1 Tablet(s) Oral three times a day  finasteride 5 milliGRAM(s) Oral daily  heparin  Injectable 5000 Unit(s) SubCutaneous every 12 hours  hydrochlorothiazide 12.5 milliGRAM(s) Oral daily  losartan 50 milliGRAM(s) Oral daily  mirabegron ER 25 milliGRAM(s) Oral <User Schedule>  pimavanserin 34 milliGRAM(s) Oral daily  sodium chloride 0.9%. 1000 milliLiter(s) (125 mL/Hr) IV Continuous <Continuous>  tamsulosin 0.8 milliGRAM(s) Oral at bedtime    MEDICATIONS  (PRN):  acetaminophen   Tablet. 325 milliGRAM(s) Oral every 4 hours PRN Moderate Pain (4 - 6)  hydrALAZINE Injectable 10 milliGRAM(s) IV Push every 8 hours PRN sbp >180 or dbp >100      PAST MEDICAL & SURGICAL HISTORY:  Urinary frequency  Parkinson's disease  HTN (hypertension)  S/P thyroidectomy: partial      REVIEW OF SYSTEMS      General: felt weak	    Skin/Breast: no itching  	  Ophthalmologic: no vision changes  	  ENMT:	no sinus problems    Respiratory and Thorax: no SOB  	  Cardiovascular:	no chest pain    Gastrointestinal:	 has constipation    Genitourinary:	see HPI    Musculoskeletal:	rigidity    Neurological:	has a tremor due to parkinson's    Psychiatric: history of hallucinations	    Hematology/Lymphatics:	no easy bleeding    Endocrine: no cold intolerance	    Allergic/Immunologic:	no allergy    Tob: former smoker                              EtOH: occasional     I&O's Detail    29 Apr 2018 07:01  -  30 Apr 2018 07:00  --------------------------------------------------------  IN:  Total IN: 0 mL    OUT:    Indwelling Catheter - Urethral: 675 mL  Total OUT: 675 mL    Total NET: -675 mL      30 Apr 2018 07:01  -  30 Apr 2018 18:53  --------------------------------------------------------  IN:    sodium chloride 0.9%.: 1000 mL  Total IN: 1000 mL    OUT:    Indwelling Catheter - Urethral: 725 mL  Total OUT: 725 mL    Total NET: 275 mL          PE:    Vital Signs Last 24 Hrs  T(C): 37 (30 Apr 2018 18:25), Max: 37.1 (30 Apr 2018 08:29)  T(F): 98.6 (30 Apr 2018 18:25), Max: 98.8 (30 Apr 2018 08:29)  HR: 84 (30 Apr 2018 18:25) (76 - 84)  BP: 136/76 (30 Apr 2018 18:25) (136/76 - 187/92)  BP(mean): --  RR: 18 (30 Apr 2018 18:25) (18 - 18)  SpO2: 98% (30 Apr 2018 18:25) (97% - 98%)    Daily     Daily     PHYSICAL EXAM:      Constitutional: does not appear in acute distress    Eyes: nl conjunctivae    ENMT: NC/AT    Neck: no obvious adenopathy    Respiratory: no respiratory distress    Cardiovascular: good cap refill    Gastrointestinal: no abdominal masses    Genitourinary: 12 Fr catheter in place. Normal phallus. meatus normal. scrotal skin normal. no testicular or epididymal masses.     Rectal: NST. no rectal masses. prostate 1+ without nodules.    Extremities: no CCE    Vascular: good radial pulses    Neurological: has tremor, pronounced    Skin: no jaundice    Lymph Nodes: no cervical nodes    Musculoskeletal: some rigidity     Psychiatric: alert and oriented X 3         Labs:                          12.4   7.8   )-----------( 154      ( 30 Apr 2018 07:48 )             37.2       04-30    145  |  108<H>  |  17.0  ----------------------------<  90  4.0   |  25.0  |  0.81    Ca    8.3<L>      30 Apr 2018 07:48      < from: US Renal (04.28.18 @ 09:25) >    No hydronephrosis of either kidney.      < end of copied text >        Impression:    urinary retention.  On Myrbetriq at home.  acute kidney injury, resolved    Plan:  stop myrbetriq  maximize activity and bowel function  Recommend PT  JASON Tierneyday AM      Arnulfo Vargas MD  04-30-18 @ 18:53

## 2018-05-01 LAB
ANION GAP SERPL CALC-SCNC: 11 MMOL/L — SIGNIFICANT CHANGE UP (ref 5–17)
BUN SERPL-MCNC: 10 MG/DL — SIGNIFICANT CHANGE UP (ref 8–20)
CALCIUM SERPL-MCNC: 8.8 MG/DL — SIGNIFICANT CHANGE UP (ref 8.6–10.2)
CHLORIDE SERPL-SCNC: 103 MMOL/L — SIGNIFICANT CHANGE UP (ref 98–107)
CK MB CFR SERPL CALC: 7.2 NG/ML — HIGH (ref 0–6.7)
CK SERPL-CCNC: CRITICAL HIGH U/L (ref 30–200)
CO2 SERPL-SCNC: 28 MMOL/L — SIGNIFICANT CHANGE UP (ref 22–29)
CREAT SERPL-MCNC: 0.71 MG/DL — SIGNIFICANT CHANGE UP (ref 0.5–1.3)
GLUCOSE SERPL-MCNC: 95 MG/DL — SIGNIFICANT CHANGE UP (ref 70–115)
HCT VFR BLD CALC: 38.3 % — LOW (ref 42–52)
HGB BLD-MCNC: 12.7 G/DL — LOW (ref 14–18)
MCHC RBC-ENTMCNC: 31.7 PG — HIGH (ref 27–31)
MCHC RBC-ENTMCNC: 33.2 G/DL — SIGNIFICANT CHANGE UP (ref 32–36)
MCV RBC AUTO: 95.5 FL — HIGH (ref 80–94)
PLATELET # BLD AUTO: 160 K/UL — SIGNIFICANT CHANGE UP (ref 150–400)
POTASSIUM SERPL-MCNC: 3.8 MMOL/L — SIGNIFICANT CHANGE UP (ref 3.5–5.3)
POTASSIUM SERPL-SCNC: 3.8 MMOL/L — SIGNIFICANT CHANGE UP (ref 3.5–5.3)
RBC # BLD: 4.01 M/UL — LOW (ref 4.6–6.2)
RBC # FLD: 11.8 % — SIGNIFICANT CHANGE UP (ref 11–15.6)
SODIUM SERPL-SCNC: 142 MMOL/L — SIGNIFICANT CHANGE UP (ref 135–145)
WBC # BLD: 5.9 K/UL — SIGNIFICANT CHANGE UP (ref 4.8–10.8)
WBC # FLD AUTO: 5.9 K/UL — SIGNIFICANT CHANGE UP (ref 4.8–10.8)

## 2018-05-01 PROCEDURE — 99232 SBSQ HOSP IP/OBS MODERATE 35: CPT

## 2018-05-01 PROCEDURE — 99233 SBSQ HOSP IP/OBS HIGH 50: CPT

## 2018-05-01 RX ORDER — LOSARTAN POTASSIUM 100 MG/1
100 TABLET, FILM COATED ORAL DAILY
Qty: 0 | Refills: 0 | Status: DISCONTINUED | OUTPATIENT
Start: 2018-05-01 | End: 2018-05-03

## 2018-05-01 RX ADMIN — LOSARTAN POTASSIUM 100 MILLIGRAM(S): 100 TABLET, FILM COATED ORAL at 11:54

## 2018-05-01 RX ADMIN — PIMAVANSERIN TARTRATE 34 MILLIGRAM(S): 10 TABLET, COATED ORAL at 11:46

## 2018-05-01 RX ADMIN — CARBIDOPA AND LEVODOPA 1 TABLET(S): 25; 100 TABLET ORAL at 18:00

## 2018-05-01 RX ADMIN — HEPARIN SODIUM 5000 UNIT(S): 5000 INJECTION INTRAVENOUS; SUBCUTANEOUS at 18:00

## 2018-05-01 RX ADMIN — LOSARTAN POTASSIUM 50 MILLIGRAM(S): 100 TABLET, FILM COATED ORAL at 05:36

## 2018-05-01 RX ADMIN — Medication 12.5 MILLIGRAM(S): at 05:36

## 2018-05-01 RX ADMIN — Medication 10 MILLIGRAM(S): at 08:39

## 2018-05-01 RX ADMIN — CARBIDOPA AND LEVODOPA 1 TABLET(S): 25; 100 TABLET ORAL at 22:05

## 2018-05-01 RX ADMIN — CARBIDOPA AND LEVODOPA 1 TABLET(S): 25; 100 TABLET ORAL at 05:36

## 2018-05-01 RX ADMIN — HEPARIN SODIUM 5000 UNIT(S): 5000 INJECTION INTRAVENOUS; SUBCUTANEOUS at 05:36

## 2018-05-01 RX ADMIN — Medication 10 MILLIGRAM(S): at 00:43

## 2018-05-01 RX ADMIN — TAMSULOSIN HYDROCHLORIDE 0.8 MILLIGRAM(S): 0.4 CAPSULE ORAL at 22:05

## 2018-05-01 RX ADMIN — SODIUM CHLORIDE 125 MILLILITER(S): 9 INJECTION INTRAMUSCULAR; INTRAVENOUS; SUBCUTANEOUS at 11:54

## 2018-05-01 RX ADMIN — SODIUM CHLORIDE 125 MILLILITER(S): 9 INJECTION INTRAMUSCULAR; INTRAVENOUS; SUBCUTANEOUS at 22:05

## 2018-05-01 RX ADMIN — FINASTERIDE 5 MILLIGRAM(S): 5 TABLET, FILM COATED ORAL at 11:50

## 2018-05-01 NOTE — PHYSICAL THERAPY INITIAL EVALUATION ADULT - PERTINENT HX OF CURRENT PROBLEM, REHAB EVAL
The patient is a 67 year old male with a history of Parkinson's disease, hypertension and bph who was brought to the ER for weakness and a fall. According to the patient and his wife at the bedside. The patient is his usual state of health until 2 days ago. He tried to get out of bed at night to use the bathroom but fell over the side of the bed. He was unable to get up on his own and lay there all night. The next morning his wife awoke to find him curled up in a ball.

## 2018-05-01 NOTE — PHYSICAL THERAPY INITIAL EVALUATION ADULT - CRITERIA FOR SKILLED THERAPEUTIC INTERVENTIONS
functional limitations in following categories/risk reduction/prevention/therapy frequency/anticipated discharge recommendation/impairments found/anticipated equipment needs at discharge/rehab potential

## 2018-05-01 NOTE — PHYSICAL THERAPY INITIAL EVALUATION ADULT - GAIT DEVIATIONS NOTED, PT EVAL
difficulty with turns due to weightshifting/decreased step length/decreased riley/decreased stride length/decreased weight-shifting ability

## 2018-05-01 NOTE — PHYSICAL THERAPY INITIAL EVALUATION ADULT - IMPAIRMENTS CONTRIBUTING TO GAIT DEVIATIONS, PT EVAL
impaired balance/decreased strength/impaired motor control/decreased sensation/impaired sensory feedback/impaired coordination

## 2018-05-01 NOTE — PHYSICAL THERAPY INITIAL EVALUATION ADULT - IMPAIRED TRANSFERS: SIT/STAND, REHAB EVAL
impaired balance/decreased strength/decreased sensation/impaired sensory feedback/impaired motor control/impaired coordination

## 2018-05-01 NOTE — PHYSICAL THERAPY INITIAL EVALUATION ADULT - PLANNED THERAPY INTERVENTIONS, PT EVAL
gait training/transfer training/motor coordination training/strengthening/neuromuscular re-education/balance training/bed mobility training

## 2018-05-01 NOTE — PHYSICAL THERAPY INITIAL EVALUATION ADULT - BALANCE DISTURBANCE, IDENTIFIED IMPAIRMENT CONTRIBUTE, REHAB EVAL
impaired motor control/decreased sensation/impaired sensory feedback/impaired coordination/decreased strength

## 2018-05-01 NOTE — PHYSICAL THERAPY INITIAL EVALUATION ADULT - MANUAL MUSCLE TESTING RESULTS, REHAB EVAL
bilateral shoulder flex 4+/5, elbow flex 4+/5, hip flex 3-/5, knee flex 2/5, knee ext 3-/5, ankle df 2-/5

## 2018-05-01 NOTE — PHYSICAL THERAPY INITIAL EVALUATION ADULT - LIGHT TOUCH SENSATION, RLE, REHAB EVAL
responses inconsistent on right foot, initially stated he did not feel anything, then able to localize on subsequent trials

## 2018-05-01 NOTE — PHYSICAL THERAPY INITIAL EVALUATION ADULT - ADDITIONAL COMMENTS
pt states he lives with his wife and two daughters in a house with 3 steps to enter (bilateral rails). used a cane prior to admit, but having falls more lately. per chart, wife assists with dressing. pt states he toilets himself. pt and wife both retired. daughters work.

## 2018-05-01 NOTE — PHYSICAL THERAPY INITIAL EVALUATION ADULT - ACTIVE RANGE OF MOTION EXAMINATION, REHAB EVAL
bilateral  lower extremity Active ROM was WFL (within functional limits)/lm. upper extremity Active ROM was WNL (within normal limits)

## 2018-05-02 ENCOUNTER — APPOINTMENT (OUTPATIENT)
Dept: NUCLEAR MEDICINE | Facility: CLINIC | Age: 68
End: 2018-05-02

## 2018-05-02 LAB
ANION GAP SERPL CALC-SCNC: 12 MMOL/L — SIGNIFICANT CHANGE UP (ref 5–17)
BUN SERPL-MCNC: 10 MG/DL — SIGNIFICANT CHANGE UP (ref 8–20)
CALCIUM SERPL-MCNC: 8.4 MG/DL — LOW (ref 8.6–10.2)
CHLORIDE SERPL-SCNC: 104 MMOL/L — SIGNIFICANT CHANGE UP (ref 98–107)
CK SERPL-CCNC: CRITICAL HIGH U/L (ref 30–200)
CO2 SERPL-SCNC: 27 MMOL/L — SIGNIFICANT CHANGE UP (ref 22–29)
CREAT SERPL-MCNC: 0.72 MG/DL — SIGNIFICANT CHANGE UP (ref 0.5–1.3)
GLUCOSE SERPL-MCNC: 87 MG/DL — SIGNIFICANT CHANGE UP (ref 70–115)
HCT VFR BLD CALC: 36.6 % — LOW (ref 42–52)
HGB BLD-MCNC: 12.1 G/DL — LOW (ref 14–18)
MCHC RBC-ENTMCNC: 31.7 PG — HIGH (ref 27–31)
MCHC RBC-ENTMCNC: 33.1 G/DL — SIGNIFICANT CHANGE UP (ref 32–36)
MCV RBC AUTO: 95.8 FL — HIGH (ref 80–94)
PLATELET # BLD AUTO: 157 K/UL — SIGNIFICANT CHANGE UP (ref 150–400)
POTASSIUM SERPL-MCNC: 3.7 MMOL/L — SIGNIFICANT CHANGE UP (ref 3.5–5.3)
POTASSIUM SERPL-SCNC: 3.7 MMOL/L — SIGNIFICANT CHANGE UP (ref 3.5–5.3)
RBC # BLD: 3.82 M/UL — LOW (ref 4.6–6.2)
RBC # FLD: 11.2 % — SIGNIFICANT CHANGE UP (ref 11–15.6)
SODIUM SERPL-SCNC: 143 MMOL/L — SIGNIFICANT CHANGE UP (ref 135–145)
WBC # BLD: 5.7 K/UL — SIGNIFICANT CHANGE UP (ref 4.8–10.8)
WBC # FLD AUTO: 5.7 K/UL — SIGNIFICANT CHANGE UP (ref 4.8–10.8)

## 2018-05-02 PROCEDURE — 99233 SBSQ HOSP IP/OBS HIGH 50: CPT

## 2018-05-02 PROCEDURE — 99222 1ST HOSP IP/OBS MODERATE 55: CPT

## 2018-05-02 RX ADMIN — Medication 10 MILLIGRAM(S): at 23:45

## 2018-05-02 RX ADMIN — SODIUM CHLORIDE 125 MILLILITER(S): 9 INJECTION INTRAMUSCULAR; INTRAVENOUS; SUBCUTANEOUS at 05:26

## 2018-05-02 RX ADMIN — FINASTERIDE 5 MILLIGRAM(S): 5 TABLET, FILM COATED ORAL at 11:23

## 2018-05-02 RX ADMIN — TAMSULOSIN HYDROCHLORIDE 0.8 MILLIGRAM(S): 0.4 CAPSULE ORAL at 21:27

## 2018-05-02 RX ADMIN — PIMAVANSERIN TARTRATE 34 MILLIGRAM(S): 10 TABLET, COATED ORAL at 11:26

## 2018-05-02 RX ADMIN — SODIUM CHLORIDE 125 MILLILITER(S): 9 INJECTION INTRAMUSCULAR; INTRAVENOUS; SUBCUTANEOUS at 21:27

## 2018-05-02 RX ADMIN — HEPARIN SODIUM 5000 UNIT(S): 5000 INJECTION INTRAVENOUS; SUBCUTANEOUS at 05:27

## 2018-05-02 RX ADMIN — SODIUM CHLORIDE 125 MILLILITER(S): 9 INJECTION INTRAMUSCULAR; INTRAVENOUS; SUBCUTANEOUS at 11:23

## 2018-05-02 RX ADMIN — CARBIDOPA AND LEVODOPA 1 TABLET(S): 25; 100 TABLET ORAL at 21:27

## 2018-05-02 RX ADMIN — LOSARTAN POTASSIUM 100 MILLIGRAM(S): 100 TABLET, FILM COATED ORAL at 05:27

## 2018-05-02 RX ADMIN — CARBIDOPA AND LEVODOPA 1 TABLET(S): 25; 100 TABLET ORAL at 05:27

## 2018-05-02 RX ADMIN — CARBIDOPA AND LEVODOPA 1 TABLET(S): 25; 100 TABLET ORAL at 15:46

## 2018-05-02 RX ADMIN — HEPARIN SODIUM 5000 UNIT(S): 5000 INJECTION INTRAVENOUS; SUBCUTANEOUS at 17:32

## 2018-05-02 NOTE — CONSULT NOTE ADULT - SUBJECTIVE AND OBJECTIVE BOX
cc: Patient is a 67y old  Male who presents with a chief complaint of Fall (28 Apr 2018 13:47)      HPI:  The patient is a 67 year old male with a history of Parkinson's disease, hypertension and bph who was brought to the ER for weakness and a fall. According to the patient and his wife at the bedside. The patient is his usual state of health until 2 days ago. He tried to get out of bed at night to use the bathroom but fell over the side of the bed. He was unable to get up on his own and lay there all night. The next morning his wife awoke to find him curled up in a ball. He was awake but very weak complaining ofbilateral leg pain. She was unable to get him up on her own and waited for her daughter. Since then he has felt very weak with poor oral intake. In the ER he was noted to have domonique and rhabdomyolysis. (28 Apr 2018 13:47). CT head: no acute findings. CT face: no  fracture. In hospital failed TOV and coude cath reinserted. TOV today. being followed by urology  Rehab consult  requested to optimise overall function and further recommendations after PT evaluation        PAST MEDICAL & SURGICAL HISTORY:  Urinary frequency  Parkinson's disease  HTN (hypertension)  S/P thyroidectomy: partial      FAMILY HISTORY:  No pertinent family history in first degree relatives      SOCIAL HISTORY:  TOBACCO: denies history  ALCOHOL: denies abuse  IVDA: denies history    FUNCTIONAL, ENVIRONMENTAL HISTORY:  WORK HISTORY: retired  LIVES WITH: children and spouse  FUNCTIONAL HISTORY: pt states he lives with his wife and two daughters in a house with 3 steps to enter (bilateral rails). used a cane prior to admit, but having falls more lately. per chart, wife assists with dressing. pt states he toilets himself.    Allergies    No Known Allergies    Intolerances        MEDICATIONS  (STANDING):  carbidopa/levodopa  25/100 1 Tablet(s) Oral three times a day  finasteride 5 milliGRAM(s) Oral daily  heparin  Injectable 5000 Unit(s) SubCutaneous every 12 hours  losartan 100 milliGRAM(s) Oral daily  pimavanserin 34 milliGRAM(s) Oral daily  sodium chloride 0.9%. 1000 milliLiter(s) (125 mL/Hr) IV Continuous <Continuous>  tamsulosin 0.8 milliGRAM(s) Oral at bedtime    MEDICATIONS  (PRN):  acetaminophen   Tablet. 325 milliGRAM(s) Oral every 4 hours PRN Moderate Pain (4 - 6)  hydrALAZINE Injectable 10 milliGRAM(s) IV Push every 8 hours PRN sbp >180 or dbp >100      REVIEW OF SYSTEMS:    CONSTITUTIONAL: No fever, weight loss, or fatigue  EYES: No eye pain, visual disturbances, or discharge  RESPIRATORY: No cough,   CARDIOVASCULAR: No chest pain,   GASTROINTESTINAL: No abdominal or epigastric pain.  GENITOURINARY: No dysuria,   NEUROLOGICAL: No headaches,  SKIN: No  rashes  MUSCULOSKELETAL: No joint pain   PSYCHIATRIC: No depression,     Vital Signs Last 24 Hrs  T(C): 36.9 (02 May 2018 07:37), Max: 37 (01 May 2018 23:28)  T(F): 98.4 (02 May 2018 07:37), Max: 98.6 (01 May 2018 23:28)  HR: 88 (02 May 2018 07:37) (74 - 92)  BP: 178/83 (02 May 2018 07:37) (132/68 - 178/83)  BP(mean): --  RR: 18 (02 May 2018 07:37) (18 - 18)  SpO2: 98% (02 May 2018 07:37) (98% - 98%)    PHYSICAL EXAM:    GENERAL: NAD,   HEAD:  Atraumatic, Normocephalic  EYES: EOMI,   HEART:S1S2+  CHEST/LUNG: Clear  ABDOMEN: Soft,   EXTREMITIES:  No edema  NERVOUS SYSTEM:      FUNCTIONAL EXAM:moderate assist for bed mobility and gait with walker     LABS:                        12.1   5.7   )-----------( 157      ( 02 May 2018 08:09 )             36.6     05-02    143  |  104  |  10.0  ----------------------------<  87  3.7   |  27.0  |  0.72    Ca    8.4<L>      02 May 2018 08:09    RADIOLOGY & ADDITIONAL STUDIES:      A/P:    67 year old male with history as above admitted s/p fall, with DOMONIQUE and rhabdomyolysis  Labs improving  Urinary retention cc: Patient is a 67y old  Male who presents with a chief complaint of Fall (28 Apr 2018 13:47). Patient's wife present at bedside      HPI:  The patient is a 67 year old male with a history of Parkinson's disease, hypertension and bph who was brought to the ER for weakness and a fall. According to the patient and his wife at the bedside. The patient is his usual state of health until 2 days ago. He tried to get out of bed at night to use the bathroom but fell over the side of the bed. He was unable to get up on his own and lay there all night. The next morning his wife awoke to find him curled up in a ball. He was awake but very weak complaining ofbilateral leg pain. She was unable to get him up on her own and waited for her daughter. Since then he has felt very weak with poor oral intake. In the ER he was noted to have domonique and rhabdomyolysis. (28 Apr 2018 13:47). CT head: no acute findings. CT face: no  fracture. In hospital failed TOV and coude cath reinserted. TOV today. being followed by urology  Rehab consult  requested to optimise overall function and further recommendations after PT evaluation.  Per patient and wife was started on medications for hallucinations by Primary neurologist  Dr. Saenz, recently        PAST MEDICAL & SURGICAL HISTORY:  Urinary frequency  Parkinson's disease  HTN (hypertension)  S/P thyroidectomy: partial      FAMILY HISTORY:  No pertinent family history in first degree relatives      SOCIAL HISTORY:  TOBACCO: denies history  ALCOHOL: denies abuse  IVDA: denies history    FUNCTIONAL, ENVIRONMENTAL HISTORY:  WORK HISTORY: retired  LIVES WITH: children and spouse  FUNCTIONAL HISTORY: pt states he lives with his wife and two daughters in a house with 3 steps to enter (bilateral rails). used a cane prior to admit, but having falls more lately. per chart, wife assists with dressing. pt states he toilets himself. uses a cane in consistently    Allergies    No Known Allergies    Intolerances        MEDICATIONS  (STANDING):  carbidopa/levodopa  25/100 1 Tablet(s) Oral three times a day  finasteride 5 milliGRAM(s) Oral daily  heparin  Injectable 5000 Unit(s) SubCutaneous every 12 hours  losartan 100 milliGRAM(s) Oral daily  pimavanserin 34 milliGRAM(s) Oral daily  sodium chloride 0.9%. 1000 milliLiter(s) (125 mL/Hr) IV Continuous <Continuous>  tamsulosin 0.8 milliGRAM(s) Oral at bedtime    MEDICATIONS  (PRN):  acetaminophen   Tablet. 325 milliGRAM(s) Oral every 4 hours PRN Moderate Pain (4 - 6)  hydrALAZINE Injectable 10 milliGRAM(s) IV Push every 8 hours PRN sbp >180 or dbp >100      REVIEW OF SYSTEMS:    CONSTITUTIONAL: No fever, weight loss, or fatigue  EYES: No eye pain, visual disturbances, or discharge  RESPIRATORY: No cough,   CARDIOVASCULAR: No chest pain,   GASTROINTESTINAL: No abdominal or epigastric pain.  GENITOURINARY: No dysuria,   NEUROLOGICAL: No headaches,  SKIN: No  rashes  MUSCULOSKELETAL: No joint pain   PSYCHIATRIC: No depression,     Vital Signs Last 24 Hrs  T(C): 36.9 (02 May 2018 07:37), Max: 37 (01 May 2018 23:28)  T(F): 98.4 (02 May 2018 07:37), Max: 98.6 (01 May 2018 23:28)  HR: 88 (02 May 2018 07:37) (74 - 92)  BP: 178/83 (02 May 2018 07:37) (132/68 - 178/83)  BP(mean): --  RR: 18 (02 May 2018 07:37) (18 - 18)  SpO2: 98% (02 May 2018 07:37) (98% - 98%)    PHYSICAL EXAM:    GENERAL: NAD, aaox3, right hand resting tremors, masked facies  HEAD:  Atraumatic, Normocephalic  EYES: EOMI,   HEART:S1S2+  CHEST/LUNG: Clear  ABDOMEN: Soft,   EXTREMITIES:  RLE edema +, no calf tenderness  NERVOUS SYSTEM:  motor 5/5, sensory intact to light touch    FUNCTIONAL EXAM:moderate assist for bed mobility and gait with walker     LABS:                        12.1   5.7   )-----------( 157      ( 02 May 2018 08:09 )             36.6     05-02    143  |  104  |  10.0  ----------------------------<  87  3.7   |  27.0  |  0.72    Ca    8.4<L>      02 May 2018 08:09    RADIOLOGY & ADDITIONAL STUDIES:    < from: CT Head No Cont (04.04.18 @ 12:50) >     EXAM:  CT BRAIN                          PROCEDURE DATE:  04/04/2018          INTERPRETATION:  History: Altered consciousness.    Multiple axial sections were performed from base of skull to vertex   without contrast enhancement. Coronal and sagittal reconstructions were   performed as well    Parenchymal volume loss and chronic microvascular changes are identified.    There is no evidence of acute hemorrhage mass or mass effect in the   posterior fossa or supratentorial region    Evaluation of the osseous structures with the appropriate window appears   unremarkable    The visualized paranasal sinuses mastoid and middle ear regions appear   clear    Impression: No evidence of acute hemorrhage, mass or mass effect.      < end of copied text >    < from: CT Maxillofacial No Cont (04.28.18 @ 16:38) >  IMPRESSION:  No evidence of acute facial bone fracture.        < end of copied text >          A/P:    67 year old male with history as above admitted s/p fall, with DOMONIQUE and rhabdomyolysis  Labs improving. Currently on IVF   Urinary retention     Patient with functional deficits including impaired gait/balance/ADLs in the setting of Parkinson's disease and recent hospitalisation.  Recommend acute rehab:PT/OT 3 hrs/day 5-6 days / week , Needs PMR physician oversight for medical comorbidities, 24 hr nursing. He will be able to tolerate acute rehab program.   Will order OT eval for ADL assesment  Continue daily PT while in hospital . Goals of rehab will be to improve balance, reduce fall risk, use of appropriate adaptive device, family education    2. Urinary retention: TOV today    will follow. Thank you cc: Patient is a 67y old  Male who presents with a chief complaint of Fall (28 Apr 2018 13:47). Patient's wife present at bedside      HPI:  The patient is a 67 year old male with a history of Parkinson's disease, hypertension and bph who was brought to the ER for weakness and a fall. According to the patient and his wife at the bedside. The patient is his usual state of health until 2 days ago. He tried to get out of bed at night to use the bathroom but fell over the side of the bed. He was unable to get up on his own and lay there all night. The next morning his wife awoke to find him curled up in a ball. He was awake but very weak complaining ofbilateral leg pain. She was unable to get him up on her own and waited for her daughter. Since then he has felt very weak with poor oral intake. In the ER he was noted to have domonique and rhabdomyolysis. (28 Apr 2018 13:47). CT head: no acute findings. CT face: no  fracture. In hospital failed TOV and coude cath reinserted. TOV today. being followed by urology  Rehab consult  requested to optimise overall function and further recommendations after PT evaluation.  Per patient and wife was started on medications for hallucinations by Primary neurologist  Dr. Saenz, recently        PAST MEDICAL & SURGICAL HISTORY:  Urinary frequency  Parkinson's disease  HTN (hypertension)  S/P thyroidectomy: partial      FAMILY HISTORY:  No pertinent family history in first degree relatives      SOCIAL HISTORY:  TOBACCO: denies history  ALCOHOL: denies abuse  IVDA: denies history    FUNCTIONAL, ENVIRONMENTAL HISTORY:  WORK HISTORY: retired  LIVES WITH: children and spouse  FUNCTIONAL HISTORY: pt states he lives with his wife and two daughters in a house with 3 steps to enter (bilateral rails). used a cane prior to admit, but having falls more lately. per chart, wife assists with dressing. pt states he toilets himself. uses a cane in consistently    Allergies    No Known Allergies    Intolerances        MEDICATIONS  (STANDING):  carbidopa/levodopa  25/100 1 Tablet(s) Oral three times a day  finasteride 5 milliGRAM(s) Oral daily  heparin  Injectable 5000 Unit(s) SubCutaneous every 12 hours  losartan 100 milliGRAM(s) Oral daily  pimavanserin 34 milliGRAM(s) Oral daily  sodium chloride 0.9%. 1000 milliLiter(s) (125 mL/Hr) IV Continuous <Continuous>  tamsulosin 0.8 milliGRAM(s) Oral at bedtime    MEDICATIONS  (PRN):  acetaminophen   Tablet. 325 milliGRAM(s) Oral every 4 hours PRN Moderate Pain (4 - 6)  hydrALAZINE Injectable 10 milliGRAM(s) IV Push every 8 hours PRN sbp >180 or dbp >100      REVIEW OF SYSTEMS:    CONSTITUTIONAL: No fever, weight loss, or fatigue  EYES: No eye pain, visual disturbances, or discharge  RESPIRATORY: No cough,   CARDIOVASCULAR: No chest pain,   GASTROINTESTINAL: No abdominal or epigastric pain.  GENITOURINARY: No dysuria,   NEUROLOGICAL: No headaches,  SKIN: No  rashes  MUSCULOSKELETAL: No joint pain   PSYCHIATRIC: No depression,     Vital Signs Last 24 Hrs  T(C): 36.9 (02 May 2018 07:37), Max: 37 (01 May 2018 23:28)  T(F): 98.4 (02 May 2018 07:37), Max: 98.6 (01 May 2018 23:28)  HR: 88 (02 May 2018 07:37) (74 - 92)  BP: 178/83 (02 May 2018 07:37) (132/68 - 178/83)  BP(mean): --  RR: 18 (02 May 2018 07:37) (18 - 18)  SpO2: 98% (02 May 2018 07:37) (98% - 98%)    PHYSICAL EXAM:    GENERAL: NAD, aaox3, right hand resting tremors, masked facies  HEAD:  Atraumatic, Normocephalic  EYES: EOMI,   HEART:S1S2+  CHEST/LUNG: Clear  ABDOMEN: Soft,   EXTREMITIES:  RLE edema +, no calf tenderness  NERVOUS SYSTEM:  motor 5/5, sensory intact to light touch    FUNCTIONAL EXAM:moderate assist for bed mobility and gait with walker     LABS:                        12.1   5.7   )-----------( 157      ( 02 May 2018 08:09 )             36.6     05-02    143  |  104  |  10.0  ----------------------------<  87  3.7   |  27.0  |  0.72    Ca    8.4<L>      02 May 2018 08:09    RADIOLOGY & ADDITIONAL STUDIES:    < from: CT Head No Cont (04.04.18 @ 12:50) >     EXAM:  CT BRAIN                          PROCEDURE DATE:  04/04/2018          INTERPRETATION:  History: Altered consciousness.    Multiple axial sections were performed from base of skull to vertex   without contrast enhancement. Coronal and sagittal reconstructions were   performed as well    Parenchymal volume loss and chronic microvascular changes are identified.    There is no evidence of acute hemorrhage mass or mass effect in the   posterior fossa or supratentorial region    Evaluation of the osseous structures with the appropriate window appears   unremarkable    The visualized paranasal sinuses mastoid and middle ear regions appear   clear    Impression: No evidence of acute hemorrhage, mass or mass effect.      < end of copied text >    < from: CT Maxillofacial No Cont (04.28.18 @ 16:38) >  IMPRESSION:  No evidence of acute facial bone fracture.        < end of copied text >          A/P:    67 year old male with history as above admitted s/p fall, with DOMONIQUE and rhabdomyolysis  Labs improving. Currently on IVF   Urinary retention     Patient with functional deficits including impaired gait/balance/ADLs in the setting of Parkinson's disease and recent hospitalisation.  Recommend acute rehab:PT/OT 3 hrs/day 5-6 days / week , Needs PMR physician oversight for medical comorbidities, 24 hr nursing. He will be able to tolerate acute rehab program.   Will order OT eval for ADL assesment  Continue daily PT while in hospital . Goals of rehab will be to improve balance, reduce fall risk, use of appropriate adaptive device, family education    2. Urinary retention: TOV today    will follow. Thank you. D/W Dr. Arguello

## 2018-05-03 ENCOUNTER — TRANSCRIPTION ENCOUNTER (OUTPATIENT)
Age: 68
End: 2018-05-03

## 2018-05-03 ENCOUNTER — APPOINTMENT (OUTPATIENT)
Dept: NUCLEAR MEDICINE | Facility: CLINIC | Age: 68
End: 2018-05-03

## 2018-05-03 ENCOUNTER — INPATIENT (INPATIENT)
Facility: HOSPITAL | Age: 68
LOS: 24 days | Discharge: HOME HEALTH | DRG: 949 | End: 2018-05-28
Attending: PHYSICAL MEDICINE & REHABILITATION | Admitting: PHYSICAL MEDICINE & REHABILITATION
Payer: MEDICARE

## 2018-05-03 VITALS
DIASTOLIC BLOOD PRESSURE: 85 MMHG | RESPIRATION RATE: 18 BRPM | TEMPERATURE: 99 F | OXYGEN SATURATION: 99 % | HEART RATE: 97 BPM | SYSTOLIC BLOOD PRESSURE: 171 MMHG

## 2018-05-03 DIAGNOSIS — G20 PARKINSON'S DISEASE: ICD-10-CM

## 2018-05-03 LAB — CK SERPL-CCNC: CRITICAL HIGH U/L (ref 30–200)

## 2018-05-03 PROCEDURE — 84484 ASSAY OF TROPONIN QUANT: CPT

## 2018-05-03 PROCEDURE — 97163 PT EVAL HIGH COMPLEX 45 MIN: CPT

## 2018-05-03 PROCEDURE — 97530 THERAPEUTIC ACTIVITIES: CPT

## 2018-05-03 PROCEDURE — 70450 CT HEAD/BRAIN W/O DYE: CPT

## 2018-05-03 PROCEDURE — 82553 CREATINE MB FRACTION: CPT

## 2018-05-03 PROCEDURE — 36415 COLL VENOUS BLD VENIPUNCTURE: CPT

## 2018-05-03 PROCEDURE — 93971 EXTREMITY STUDY: CPT

## 2018-05-03 PROCEDURE — 99239 HOSP IP/OBS DSCHRG MGMT >30: CPT

## 2018-05-03 PROCEDURE — 93010 ELECTROCARDIOGRAM REPORT: CPT

## 2018-05-03 PROCEDURE — 82550 ASSAY OF CK (CPK): CPT

## 2018-05-03 PROCEDURE — 85027 COMPLETE CBC AUTOMATED: CPT

## 2018-05-03 PROCEDURE — 80053 COMPREHEN METABOLIC PANEL: CPT

## 2018-05-03 PROCEDURE — 80048 BASIC METABOLIC PNL TOTAL CA: CPT

## 2018-05-03 PROCEDURE — 87086 URINE CULTURE/COLONY COUNT: CPT

## 2018-05-03 PROCEDURE — 97116 GAIT TRAINING THERAPY: CPT

## 2018-05-03 PROCEDURE — 99232 SBSQ HOSP IP/OBS MODERATE 35: CPT

## 2018-05-03 PROCEDURE — 76775 US EXAM ABDO BACK WALL LIM: CPT

## 2018-05-03 PROCEDURE — 93005 ELECTROCARDIOGRAM TRACING: CPT

## 2018-05-03 PROCEDURE — 70486 CT MAXILLOFACIAL W/O DYE: CPT

## 2018-05-03 PROCEDURE — 51702 INSERT TEMP BLADDER CATH: CPT

## 2018-05-03 PROCEDURE — 99285 EMERGENCY DEPT VISIT HI MDM: CPT | Mod: 25

## 2018-05-03 PROCEDURE — 81001 URINALYSIS AUTO W/SCOPE: CPT

## 2018-05-03 RX ORDER — METOPROLOL TARTRATE 50 MG
25 TABLET ORAL
Qty: 0 | Refills: 0 | Status: DISCONTINUED | OUTPATIENT
Start: 2018-05-03 | End: 2018-05-03

## 2018-05-03 RX ORDER — MIRABEGRON 50 MG/1
1 TABLET, EXTENDED RELEASE ORAL
Qty: 0 | Refills: 0 | COMMUNITY

## 2018-05-03 RX ORDER — SODIUM CHLORIDE 9 MG/ML
0 INJECTION INTRAMUSCULAR; INTRAVENOUS; SUBCUTANEOUS
Qty: 0 | Refills: 0 | DISCHARGE
Start: 2018-05-03

## 2018-05-03 RX ADMIN — SODIUM CHLORIDE 125 MILLILITER(S): 9 INJECTION INTRAMUSCULAR; INTRAVENOUS; SUBCUTANEOUS at 10:33

## 2018-05-03 RX ADMIN — FINASTERIDE 5 MILLIGRAM(S): 5 TABLET, FILM COATED ORAL at 14:09

## 2018-05-03 RX ADMIN — Medication 10 MILLIGRAM(S): at 14:14

## 2018-05-03 RX ADMIN — Medication 10 MILLIGRAM(S): at 07:58

## 2018-05-03 RX ADMIN — CARBIDOPA AND LEVODOPA 1 TABLET(S): 25; 100 TABLET ORAL at 10:32

## 2018-05-03 RX ADMIN — HEPARIN SODIUM 5000 UNIT(S): 5000 INJECTION INTRAVENOUS; SUBCUTANEOUS at 10:32

## 2018-05-03 RX ADMIN — LOSARTAN POTASSIUM 100 MILLIGRAM(S): 100 TABLET, FILM COATED ORAL at 10:27

## 2018-05-03 RX ADMIN — PIMAVANSERIN TARTRATE 34 MILLIGRAM(S): 10 TABLET, COATED ORAL at 14:09

## 2018-05-03 NOTE — DISCHARGE NOTE ADULT - PLAN OF CARE
Monitor renal function resolved Continue normal saline 0.9% at 125ml/hr until ck normalizes Continue afluzosin and proscar  Myrbetriq disconitnued continue sinement Continue cozaar  monitor bp on iv fluids

## 2018-05-03 NOTE — PROGRESS NOTE ADULT - SUBJECTIVE AND OBJECTIVE BOX
CC: Tremors    INTERVAL HPI/OVERNIGHT EVENTS: Patient seen and examined, became restless and agitated last night. THis morning he feels the tremor in his right arm is worse, he is very anxious and wants to go home. He seemed to calm down when reassured. Afebrile. Denies urinary or bowel complaints. Ramos in place.       Vital Signs Last 24 Hrs  T(C): 36.7 (01 May 2018 07:58), Max: 37 (30 Apr 2018 18:25)  T(F): 98 (01 May 2018 07:58), Max: 98.6 (30 Apr 2018 18:25)  HR: 78 (01 May 2018 09:00) (76 - 90)  BP: 148/68 (01 May 2018 09:00) (136/76 - 220/101)  BP(mean): --  RR: 19 (01 May 2018 07:58) (18 - 20)  SpO2: 96% (01 May 2018 07:58) (95% - 98%)    PHYSICAL EXAM:    GENERAL: NAD, AOX3  HEAD:  Atraumatic, Normocephalic  EYES: EOMI, PERRLA, conjunctiva and sclera clear  ENMT: Moist mucous membranes  CHEST/LUNG: Clear to auscultation bilaterally; No rales, rhonchi, wheezing, or rubs  HEART: Regular rate and rhythm; No murmurs, rubs, or gallops  ABDOMEN: Soft, Nontender, Nondistended; Bowel sounds present  EXTREMITIES:  2+ Peripheral Pulses, No clubbing, cyanosis, or edema        MEDICATIONS  (STANDING):  carbidopa/levodopa  25/100 1 Tablet(s) Oral three times a day  finasteride 5 milliGRAM(s) Oral daily  heparin  Injectable 5000 Unit(s) SubCutaneous every 12 hours  losartan 100 milliGRAM(s) Oral daily  pimavanserin 34 milliGRAM(s) Oral daily  sodium chloride 0.9%. 1000 milliLiter(s) (125 mL/Hr) IV Continuous <Continuous>  tamsulosin 0.8 milliGRAM(s) Oral at bedtime    MEDICATIONS  (PRN):  acetaminophen   Tablet. 325 milliGRAM(s) Oral every 4 hours PRN Moderate Pain (4 - 6)  hydrALAZINE Injectable 10 milliGRAM(s) IV Push every 8 hours PRN sbp >180 or dbp >100      Allergies    No Known Allergies    Intolerances          LABS:                          12.7   5.9   )-----------( 160      ( 01 May 2018 08:06 )             38.3     05-01    142  |  103  |  10.0  ----------------------------<  95  3.8   |  28.0  |  0.71    Ca    8.8      01 May 2018 08:06            RADIOLOGY & ADDITIONAL TESTS:
CC: weakness    INTERVAL HPI/OVERNIGHT EVENTS:  Patient seen and examined, feels fine this morning. Voiding fine after thrasher removal yesterday. Denies abdominal pain, urinary or bowel complaints. Was asleep this morning and morning meds were not given by Rn including cozaar. BP then elevated and given a dose of i hydralazine x. 1    Vital Signs Last 24 Hrs  T(C): 37.1 (03 May 2018 08:17), Max: 37.1 (03 May 2018 08:17)  T(F): 98.7 (03 May 2018 08:17), Max: 98.7 (03 May 2018 08:17)  HR: 93 (03 May 2018 09:51) (84 - 98)  BP: 157/75 (03 May 2018 09:51) (142/82 - 191/95)  BP(mean): --  RR: 19 (03 May 2018 08:17) (18 - 19)  SpO2: 100% (03 May 2018 08:17) (97% - 100%)    PHYSICAL EXAM:    GENERAL: NAD, AOX3  HEAD:  Atraumatic, Normocephalic  EYES: EOMI, PERRLA, conjunctiva and sclera clear  ENMT: Moist mucous membranes  CHEST/LUNG: Clear to auscultation bilaterally; No rales, rhonchi, wheezing, or rubs  HEART: Regular rate and rhythm; No murmurs, rubs, or gallops  ABDOMEN: Soft, Nontender, Nondistended; Bowel sounds present  EXTREMITIES:  2+ Peripheral Pulses, No clubbing, cyanosis, or edema        MEDICATIONS  (STANDING):  carbidopa/levodopa  25/100 1 Tablet(s) Oral three times a day  finasteride 5 milliGRAM(s) Oral daily  heparin  Injectable 5000 Unit(s) SubCutaneous every 12 hours  losartan 100 milliGRAM(s) Oral daily  metoprolol tartrate 25 milliGRAM(s) Oral two times a day  pimavanserin 34 milliGRAM(s) Oral daily  sodium chloride 0.9%. 1000 milliLiter(s) (125 mL/Hr) IV Continuous <Continuous>  tamsulosin 0.8 milliGRAM(s) Oral at bedtime    MEDICATIONS  (PRN):  acetaminophen   Tablet. 325 milliGRAM(s) Oral every 4 hours PRN Moderate Pain (4 - 6)  hydrALAZINE Injectable 10 milliGRAM(s) IV Push every 8 hours PRN sbp >180 or dbp >100      Allergies    No Known Allergies    Intolerances          LABS:                          12.1   5.7   )-----------( 157      ( 02 May 2018 08:09 )             36.6     05-02    143  |  104  |  10.0  ----------------------------<  87  3.7   |  27.0  |  0.72    Ca    8.4<L>      02 May 2018 08:09            RADIOLOGY & ADDITIONAL TESTS:
ANNA FLORES  [id number]  67y, Male    Acute renal failure      Patient is a 67y old  Male who presents with a chief complaint of Fall (28 Apr 2018 13:47)      HPI:  Patient feels stronger today. no hematria or fevers or chills. no flank pain.     Allergies    No Known Allergies    Intolerances        MEDICATIONS  (STANDING):  carbidopa/levodopa  25/100 1 Tablet(s) Oral three times a day  finasteride 5 milliGRAM(s) Oral daily  heparin  Injectable 5000 Unit(s) SubCutaneous every 12 hours  losartan 100 milliGRAM(s) Oral daily  pimavanserin 34 milliGRAM(s) Oral daily  sodium chloride 0.9%. 1000 milliLiter(s) (125 mL/Hr) IV Continuous <Continuous>  tamsulosin 0.8 milliGRAM(s) Oral at bedtime    MEDICATIONS  (PRN):  acetaminophen   Tablet. 325 milliGRAM(s) Oral every 4 hours PRN Moderate Pain (4 - 6)  hydrALAZINE Injectable 10 milliGRAM(s) IV Push every 8 hours PRN sbp >180 or dbp >100      PAST MEDICAL & SURGICAL HISTORY:  Urinary frequency  Parkinson's disease  HTN (hypertension)  S/P thyroidectomy: partial      I&O's Detail    30 Apr 2018 07:01  -  01 May 2018 07:00  --------------------------------------------------------  IN:    sodium chloride 0.9%.: 1500 mL  Total IN: 1500 mL    OUT:    Indwelling Catheter - Urethral: 4450 mL  Total OUT: 4450 mL    Total NET: -2950 mL      01 May 2018 07:01  -  01 May 2018 17:35  --------------------------------------------------------  IN:  Total IN: 0 mL    OUT:    Indwelling Catheter - Urethral: 1150 mL  Total OUT: 1150 mL    Total NET: -1150 mL          PE:    Vital Signs Last 24 Hrs  T(C): 36.7 (01 May 2018 07:58), Max: 37 (30 Apr 2018 18:25)  T(F): 98 (01 May 2018 07:58), Max: 98.6 (30 Apr 2018 18:25)  HR: 74 (01 May 2018 11:58) (74 - 90)  BP: 142/70 (01 May 2018 16:43) (132/68 - 220/101)  BP(mean): --  RR: 19 (01 May 2018 07:58) (18 - 20)  SpO2: 96% (01 May 2018 07:58) (95% - 98%)    Daily     Daily     PHYSICAL EXAM:      Constitutional: no acute distress    Eyes: conjugate gaze    ENMT: NC/AT    Respiratory: no respiratory distress    Cardiovascular: normal cap refill    Genitourinary: thrasher in place. Urine clear yellow    Neurological: tremor    Skin: no rash appreciated    Psychiatric: alert and oriented.        Labs:                          12.7   5.9   )-----------( 160      ( 01 May 2018 08:06 )             38.3       05-01    142  |  103  |  10.0  ----------------------------<  95  3.8   |  28.0  |  0.71    Ca    8.8      01 May 2018 08:06            Impression:    Urinary retention  Parkinson's disease  acute renal failure resolved.    Plan:  voiding trial in AM  bladder scan orders written  Sit on side of bed or in chair with supervision    Arnulfo Vargas MD  05-01-18 @ 17:35
CC: Abdominal pain    INTERVAL HPI/OVERNIGHT EVENTS: Patient seen and examined, thrasher removed this morning. Denies abdominal pain nausea or vomiting. Leg pain and cramps improving.       Vital Signs Last 24 Hrs  T(C): 36.9 (02 May 2018 07:37), Max: 37 (01 May 2018 23:28)  T(F): 98.4 (02 May 2018 07:37), Max: 98.6 (01 May 2018 23:28)  HR: 80 (02 May 2018 10:12) (74 - 92)  BP: 160/86 (02 May 2018 10:12) (132/68 - 178/83)  BP(mean): --  RR: 18 (02 May 2018 07:37) (18 - 18)  SpO2: 98% (02 May 2018 07:37) (98% - 98%)    PHYSICAL EXAM:    GENERAL: NAD, AOX3  HEAD:  Atraumatic, Normocephalic  EYES: EOMI, PERRLA, conjunctiva and sclera clear  ENMT: Moist mucous membrane  CHEST/LUNG: Clear to auscultation bilaterally; No rales, rhonchi, wheezing, or rubs  HEART: Regular rate and rhythm; No murmurs, rubs, or gallops  ABDOMEN: Soft, Nontender, Nondistended; Bowel sounds present  EXTREMITIES:  2+ Peripheral Pulses, No clubbing, cyanosis, trace pedal edema bilaterally         MEDICATIONS  (STANDING):  carbidopa/levodopa  25/100 1 Tablet(s) Oral three times a day  finasteride 5 milliGRAM(s) Oral daily  heparin  Injectable 5000 Unit(s) SubCutaneous every 12 hours  losartan 100 milliGRAM(s) Oral daily  pimavanserin 34 milliGRAM(s) Oral daily  sodium chloride 0.9%. 1000 milliLiter(s) (125 mL/Hr) IV Continuous <Continuous>  tamsulosin 0.8 milliGRAM(s) Oral at bedtime    MEDICATIONS  (PRN):  acetaminophen   Tablet. 325 milliGRAM(s) Oral every 4 hours PRN Moderate Pain (4 - 6)  hydrALAZINE Injectable 10 milliGRAM(s) IV Push every 8 hours PRN sbp >180 or dbp >100      Allergies    No Known Allergies    Intolerances          LABS:                          12.1   5.7   )-----------( 157      ( 02 May 2018 08:09 )             36.6     05-02    143  |  104  |  10.0  ----------------------------<  87  3.7   |  27.0  |  0.72    Ca    8.4<L>      02 May 2018 08:09            RADIOLOGY & ADDITIONAL TESTS:
CC: Weakness     INTERVAL HPI/OVERNIGHT EVENTS: Patient seen and examined, feels better today. Still has bilateral leg pain. Denies nausea, vomiting, abdominal pain. Denies sob, chest pain or palpitations. Denies urinary or bowel complaints.       Vital Signs Last 24 Hrs  T(C): 36.8 (2018 08:24), Max: 37.1 (2018 17:57)  T(F): 98.3 (2018 08:24), Max: 98.8 (2018 17:57)  HR: 71 (2018 08:24) (71 - 86)  BP: 176/87 (2018 08:24) (125/68 - 176/87)  BP(mean): --  RR: 18 (2018 08:24) (18 - 20)  SpO2: 97% (2018 08:24) (96% - 100%)    PHYSICAL EXAM:    GENERAL: NAD, AOX3  HEAD:  Atraumatic, Normocephalic  EYES: EOMI, PERRLA, conjunctiva and sclera clear  ENMT: Moist mucous membranes  CHEST/LUNG: Clear to auscultation bilaterally; No rales, rhonchi, wheezing, or rubs  HEART: Regular rate and rhythm; No murmurs, rubs, or gallops  ABDOMEN: Soft, Nontender, Nondistended; Bowel sounds present  EXTREMITIES:  2+ Peripheral Pulses, No clubbing, cyanosis, or edema        MEDICATIONS  (STANDING):  carbidopa/levodopa  25/100 1 Tablet(s) Oral three times a day  finasteride 5 milliGRAM(s) Oral daily  heparin  Injectable 5000 Unit(s) SubCutaneous every 12 hours  mirabegron ER 25 milliGRAM(s) Oral <User Schedule>  pimavanserin 34 milliGRAM(s) Oral daily  sodium chloride 0.9%. 1000 milliLiter(s) (125 mL/Hr) IV Continuous <Continuous>  tamsulosin 0.8 milliGRAM(s) Oral at bedtime    MEDICATIONS  (PRN):      Allergies    No Known Allergies    Intolerances          LABS:                          12.6   7.5   )-----------( 140      ( 2018 07:43 )             38.7     04-29    143  |  108<H>  |  28.0<H>  ----------------------------<  87  3.8   |  26.0  |  1.13    Ca    8.4<L>      2018 07:43    TPro  6.8  /  Alb  4.0  /  TBili  1.0  /  DBili  x   /  AST  303<H>  /  ALT  19  /  AlkPhos  62  04-28      Urinalysis Basic - ( 2018 10:30 )    Color: Yellow / Appearance: Clear / S.010 / pH: x  Gluc: x / Ketone: Trace  / Bili: Negative / Urobili: 1 mg/dL   Blood: x / Protein: 100 mg/dL / Nitrite: Negative   Leuk Esterase: Trace / RBC: >50 /HPF / WBC >50   Sq Epi: x / Non Sq Epi: Few / Bacteria: Few        RADIOLOGY & ADDITIONAL TESTS:
INTERVAL SUBJECTIVE & REVIEW OF SYMPTOMS:Chart reviewed. Patient seen at bedside. States that he feels well, but is tired. Voiding after d/c of thrasher      MEDICATIONS:  acetaminophen   Tablet. 325 milliGRAM(s) Oral every 4 hours PRN  carbidopa/levodopa  25/100 1 Tablet(s) Oral three times a day  finasteride 5 milliGRAM(s) Oral daily  heparin  Injectable 5000 Unit(s) SubCutaneous every 12 hours  hydrALAZINE Injectable 10 milliGRAM(s) IV Push every 8 hours PRN  losartan 100 milliGRAM(s) Oral daily  pimavanserin 34 milliGRAM(s) Oral daily  sodium chloride 0.9%. 1000 milliLiter(s) IV Continuous <Continuous>  tamsulosin 0.8 milliGRAM(s) Oral at bedtime      REVIEW OF SYSTEMS  [   ] Constitutional fatigue  [  x ] Cardio WNL  [   x] Resp WNL  [  x ] GI WNL  [   ]  WNL    VITAL SIGNS  Vital Signs Last 24 Hrs  T(C): 37.4 (03 May 2018 14:07), Max: 37.4 (03 May 2018 14:07)  T(F): 99.4 (03 May 2018 14:07), Max: 99.4 (03 May 2018 14:07)  HR: 97 (03 May 2018 14:07) (84 - 98)  BP: 171/85 (03 May 2018 14:07) (142/82 - 191/95)  BP(mean): --  RR: 18 (03 May 2018 14:07) (18 - 19)  SpO2: 99% (03 May 2018 14:07) (97% - 100%)    PHYSICAL EXAM  General: NAD  Cardio: s1s2+  Resp: Clear  Abdomen: soft, NT  Neuro: aaox3, hand tremors, motor 5/5   Extrem: LE edema +         RECENT LABS:                        12.1   5.7   )-----------( 157      ( 02 May 2018 08:09 )             36.6     05-02    143  |  104  |  10.0  ----------------------------<  87  3.7   |  27.0  |  0.72    Ca    8.4<L>      02 May 2018 08:09    RADIOLOGY/OTHER RESULTS:    A/P:    67 year old male with history as above admitted s/p fall, with DOMONIQUE and rhabdomyolysis  Patient with functional deficits including impaired gait/balance/ADLs in the setting of Parkinson's disease and recent hospitalisation.  Recommend acute rehab:PT/OT 3 hrs/day 5-6 days / week , Needs PMR physician oversight for medical comorbidities, 24 hr nursing. He will be able to tolerate acute rehab program.   May be transferred to rehab if stable.     2. BP noted to be elevated intermittently. To be monitored in rehab.
CC: Weakness    INTERVAL HPI/OVERNIGHT EVENTS: Patient seen and examined, TOV was attempted yesterday. Patient noted to retain about 500ml of urine post removal, thrasher was placed again. Denies cough, sob or chest pain. +BM, denies abdominal pain nausea or vomiting. Still has discomfort in both his legs.       Vital Signs Last 24 Hrs  T(C): 37.1 (2018 08:29), Max: 37.2 (2018 16:37)  T(F): 98.8 (2018 08:29), Max: 98.9 (2018 16:37)  HR: 83 (2018 08:29) (72 - 83)  BP: 162/79 (2018 08:29) (162/79 - 200/94)  BP(mean): --  RR: 18 (2018 08:29) (18 - 20)  SpO2: 97% (2018 08:29) (97% - 98%)    PHYSICAL EXAM:    GENERAL: NAD, AOX3  HEAD:  Atraumatic, Normocephalic  EYES: EOMI, PERRLA, conjunctiva and sclera clear  ENMT: Moist mucous membranes  CHEST/LUNG: Clear to auscultation bilaterally; No rales, rhonchi, wheezing, or rubs  HEART: Regular rate and rhythm; No murmurs, rubs, or gallops  ABDOMEN: Soft, Nontender, Nondistended; Bowel sounds present + THRASHER   EXTREMITIES:  2+ Peripheral Pulses, No clubbing, cyanosis, 1+ edema bilaterally         MEDICATIONS  (STANDING):  carbidopa/levodopa  25/100 1 Tablet(s) Oral three times a day  finasteride 5 milliGRAM(s) Oral daily  heparin  Injectable 5000 Unit(s) SubCutaneous every 12 hours  hydrochlorothiazide 12.5 milliGRAM(s) Oral daily  losartan 50 milliGRAM(s) Oral daily  mirabegron ER 25 milliGRAM(s) Oral <User Schedule>  pimavanserin 34 milliGRAM(s) Oral daily  sodium chloride 0.9%. 1000 milliLiter(s) (125 mL/Hr) IV Continuous <Continuous>  tamsulosin 0.8 milliGRAM(s) Oral at bedtime    MEDICATIONS  (PRN):  acetaminophen   Tablet. 325 milliGRAM(s) Oral every 4 hours PRN Moderate Pain (4 - 6)  hydrALAZINE Injectable 10 milliGRAM(s) IV Push every 8 hours PRN sbp >180 or dbp >100      Allergies    No Known Allergies    Intolerances          LABS:                          12.4   7.8   )-----------( 154      ( 2018 07:48 )             37.2     04-30    145  |  108<H>  |  17.0  ----------------------------<  90  4.0   |  25.0  |  0.81    Ca    8.3<L>      2018 07:48        Urinalysis Basic - ( 2018 10:30 )    Color: Yellow / Appearance: Clear / S.010 / pH: x  Gluc: x / Ketone: Trace  / Bili: Negative / Urobili: 1 mg/dL   Blood: x / Protein: 100 mg/dL / Nitrite: Negative   Leuk Esterase: Trace / RBC: >50 /HPF / WBC >50   Sq Epi: x / Non Sq Epi: Few / Bacteria: Few        RADIOLOGY & ADDITIONAL TESTS:

## 2018-05-03 NOTE — PROGRESS NOTE ADULT - ASSESSMENT
The patient is a 67 year old male with a history of Parkinson's disease, hypertension and bph who was brought to the ER for weakness and a fall.  Admitted for acute renal failure and rhabdomyolysis.     Assessment/Plan:    1. DOMONIQUE: Baseline creatine normal in 04/2018- 0.79; Renal ultrasound with no hydronephrosis. Now resolved. COntinue iv fluids.  Monitor bmp closely    2. Rhabdomyolosis from fall causing lower extremity pain/cramping: IV hydration. monitor ck    3. Hypertension: Resume cozaar and start hctz for elevated bp. MOnitor bp closely      4. Parkinson's disease; progressively worsening over the past few months. Continue Sinemet  On Nyplazid for hallucinations    5. History of BPH: on flomax. Failed trial of void. Swift placed again yesterday. Urology consult.     Pt evaluation- [place] vs home with home care    VTE_ heparin subcut
The patient is a 67 year old male with a history of Parkinson's disease, hypertension and bph who was brought to the ER for weakness and a fall.  Admitted for acute renal failure and rhabdomyolysis.     Assessment/Plan:    1. DOMONIQUE: Baseline creatine normal in 04/2018- 0.79; Renal ultrasound with no hydronephrosis. Now resolved. COntinue iv fluids.  Monitor bmp closely    2. Rhabdomyolosis from fall causing lower extremity pain/cramping: IV hydration. monitor ck    3. Hypertension: Increase cozaar to 100mg OD. Hold hctz for worsening rhabdo. Montior bp closely       4. Parkinson's disease; progressively worsening over the past few months. Continue Sinemet  On Nyplazid for hallucinations    5. History of BPH: on flomax. Failed trial of void. Evaluated by urology, myrbetriq discontinue. TOV in am    Pt evaluation- [place] vs home with home care    VTE_ heparin subcut
The patient is a 67 year old male with a history of Parkinson's disease, hypertension and bph who was brought to the ER for weakness and a fall.  Admitted for acute renal failure and rhabdomyolysis. DOMONIQUE resolved with iv hydration. Noted to have acute urinary retention, thrasher was placed failed initial trial of void and thrasher re-placed. Urology was consulted, myrbetric discontinued. Evaluated by PT and PMNR, candidate for acute rehab on discharge.     Assessment/Plan:    1. DOMONIQUE: Baseline creatine normal in 04/2018- 0.79; Renal ultrasound with no hydronephrosis. Now resolved.     2. Rhabdomyolosis from fall: CK improving, continue iv hydration. Monitor ck     3. Hypertension: Cozaar to 100mg OD.      4. Parkinson's disease; progressively worsening over the past few months. Continue Sinemet  On Nyplazid for hallucinations    5. History of BPH: on flomax. Voiding well after thrasher removal. Myrbetriq discontinued    TO be discharged to Acute rehab, per CM can continue iv fluids for rhabdomyolysis at rehab.
The patient is a 67 year old male with a history of Parkinson's disease, hypertension and bph who was brought to the ER for weakness and a fall.  Admitted for acute renal failure and rhabdomyolysis.     Assessment/Plan:    1. DOMONIQUE: Baseline creatine normal in 04/2018- 0.79; Renal ultrasound with no hydronephrosis. Now resolved. COntinue iv fluids. D/c thrasher cathter   Monitor bmp closely      2. Rhabdomyolosis from fall causing lower extremity pain/cramping: IV hydration. monitor ck    3. Hypertension: hold cozaar for domonique and hypovolemia  Continue Myrbetric    4. Parkinson's disease; progressively worsening over the past few months. Continue Sinemet  On Nyplazid for hallucinations    5. History of BPH: on flomax. D.c thrasher. Check PVR.     Pt evaluation- [place] vs home with home care    VTE_ heparin subcut
The patient is a 67 year old male with a history of Parkinson's disease, hypertension and bph who was brought to the ER for weakness and a fall.  Admitted for acute renal failure and rhabdomyolysis. DOMONIQUE resolved with iv hydration. Noted to have acute urinary retention, thrasher was placed failed initial trial of void and thrasher re-placed. Urology was consulted, myrbetric discontinued. Evaluated by PT and PMNR, candidate for acute rehab on discharge.     Assessment/Plan:    1. DOMONIQUE: Baseline creatine normal in 04/2018- 0.79; Renal ultrasound with no hydronephrosis. Now resolved. COntinue iv fluids.  Monitor bmp closely    2. Rhabdomyolosis from fall: CK improving, continue iv hydration. Monitor ck     3. Hypertension: Cozaar to 100mg OD. Hold hctz for worsening rhabdo. Montior bp closely       4. Parkinson's disease; progressively worsening over the past few months. Continue Sinemet  On Nyplazid for hallucinations  Acute rehab on discharge    5. History of BPH: on flomax. Thrasher removed, Asked RN to bladder scan for PVR      VTE_ heparin subcut

## 2018-05-03 NOTE — DISCHARGE NOTE ADULT - MEDICATION SUMMARY - MEDICATIONS TO STOP TAKING
I will STOP taking the medications listed below when I get home from the hospital:    Myrbetriq 25 mg oral tablet, extended release  -- 1 tab(s) by mouth once a day

## 2018-05-03 NOTE — DISCHARGE NOTE ADULT - CARE PLAN
Principal Discharge DX:	Acute renal failure, unspecified acute renal failure type  Goal:	Monitor renal function  Assessment and plan of treatment:	resolved  Secondary Diagnosis:	Non-traumatic rhabdomyolysis  Assessment and plan of treatment:	Continue normal saline 0.9% at 125ml/hr until ck normalizes  Secondary Diagnosis:	BPH with obstruction/lower urinary tract symptoms  Assessment and plan of treatment:	Continue afluzosin and proscar  Myrbetriq disconitnued  Secondary Diagnosis:	Parkinson's disease  Assessment and plan of treatment:	continue sinement  Secondary Diagnosis:	Essential hypertension  Assessment and plan of treatment:	Continue cozaar  monitor bp on iv fluids

## 2018-05-03 NOTE — DISCHARGE NOTE ADULT - HOSPITAL COURSE
The patient is a 67 year old male with a history of Parkinson's disease, hypertension and bph who was brought to the ER for weakness and a fall.  Admitted for acute renal failure and rhabdomyolysis. DOMONIQUE resolved with iv hydration. Noted to have acute urinary retention, thrasher was placed failed initial trial of void and thrasher re-placed. Urology was consulted, myrbetric discontinued. Evaluated by PT and PMNR, candidate for acute rehab on discharge. TO continue iv hydration at acute rehab until rhabdomyolysis resolves. Voiding well after thrasher removal.       TO be discharged to Acute rehab, per CM can continue iv fluids for rhabdomyolysis at rehab.  40 min spent

## 2018-05-03 NOTE — DISCHARGE NOTE ADULT - SECONDARY DIAGNOSIS.
Non-traumatic rhabdomyolysis BPH with obstruction/lower urinary tract symptoms Parkinson's disease Essential hypertension

## 2018-05-03 NOTE — DISCHARGE NOTE ADULT - MEDICATION SUMMARY - MEDICATIONS TO TAKE
I will START or STAY ON the medications listed below when I get home from the hospital:    Proscar 5 mg oral tablet  -- 1 tab(s) by mouth once a day  -- Indication: For bph    losartan 100 mg oral tablet  -- 1 tab(s) by mouth once a day  -- Indication: For hypertension    alfuzosin 10 mg oral tablet, extended release  -- 1 tab(s) by mouth once a day  -- Indication: For bph    carbidopa-levodopa 25 mg-100 mg oral tablet  -- 1 tab(s) by mouth 3 times a day  -- Indication: For parkinsons    Nuplazid 17 mg oral tablet  -- 2 tab(s) by mouth once a day (at bedtime)  -- Indication: For parkinsosn    sodium chloride 0.9% injectable solution  -- 125ml/hr until CK normalizes   -- Indication: For Rhabdomyolysis

## 2018-05-03 NOTE — DISCHARGE NOTE ADULT - PATIENT PORTAL LINK FT
You can access the [facility] Patient Portal, offered by [facility], by registering with the following website: [email or web address]

## 2018-05-04 PROCEDURE — 93010 ELECTROCARDIOGRAM REPORT: CPT

## 2018-05-04 PROCEDURE — 99222 1ST HOSP IP/OBS MODERATE 55: CPT

## 2018-05-04 PROCEDURE — 99222 1ST HOSP IP/OBS MODERATE 55: CPT | Mod: AI

## 2018-05-04 PROCEDURE — 71045 X-RAY EXAM CHEST 1 VIEW: CPT | Mod: 26

## 2018-05-06 PROCEDURE — 99232 SBSQ HOSP IP/OBS MODERATE 35: CPT

## 2018-05-07 PROCEDURE — 99232 SBSQ HOSP IP/OBS MODERATE 35: CPT | Mod: AI

## 2018-05-08 PROCEDURE — 99232 SBSQ HOSP IP/OBS MODERATE 35: CPT | Mod: GC

## 2018-05-08 PROCEDURE — 96116 NUBHVL XM PHYS/QHP 1ST HR: CPT

## 2018-05-09 PROCEDURE — 99232 SBSQ HOSP IP/OBS MODERATE 35: CPT

## 2018-05-10 PROCEDURE — 99232 SBSQ HOSP IP/OBS MODERATE 35: CPT

## 2018-05-11 PROCEDURE — 90792 PSYCH DIAG EVAL W/MED SRVCS: CPT

## 2018-05-11 PROCEDURE — 99232 SBSQ HOSP IP/OBS MODERATE 35: CPT

## 2018-05-12 PROCEDURE — 99232 SBSQ HOSP IP/OBS MODERATE 35: CPT

## 2018-05-13 PROCEDURE — 99232 SBSQ HOSP IP/OBS MODERATE 35: CPT

## 2018-05-14 PROCEDURE — 99232 SBSQ HOSP IP/OBS MODERATE 35: CPT

## 2018-05-14 PROCEDURE — 99232 SBSQ HOSP IP/OBS MODERATE 35: CPT | Mod: GC

## 2018-05-15 PROCEDURE — 99233 SBSQ HOSP IP/OBS HIGH 50: CPT

## 2018-05-16 PROCEDURE — 99232 SBSQ HOSP IP/OBS MODERATE 35: CPT

## 2018-05-16 PROCEDURE — 99233 SBSQ HOSP IP/OBS HIGH 50: CPT

## 2018-05-17 PROCEDURE — 99233 SBSQ HOSP IP/OBS HIGH 50: CPT | Mod: GC

## 2018-05-17 PROCEDURE — 99233 SBSQ HOSP IP/OBS HIGH 50: CPT

## 2018-05-17 PROCEDURE — 99232 SBSQ HOSP IP/OBS MODERATE 35: CPT

## 2018-05-18 PROCEDURE — 70450 CT HEAD/BRAIN W/O DYE: CPT | Mod: 26

## 2018-05-18 PROCEDURE — 99233 SBSQ HOSP IP/OBS HIGH 50: CPT

## 2018-05-18 PROCEDURE — 99291 CRITICAL CARE FIRST HOUR: CPT

## 2018-05-19 PROCEDURE — 99233 SBSQ HOSP IP/OBS HIGH 50: CPT

## 2018-05-20 PROCEDURE — 99222 1ST HOSP IP/OBS MODERATE 55: CPT

## 2018-05-20 PROCEDURE — 99232 SBSQ HOSP IP/OBS MODERATE 35: CPT

## 2018-05-21 PROCEDURE — 99233 SBSQ HOSP IP/OBS HIGH 50: CPT

## 2018-05-21 PROCEDURE — 99232 SBSQ HOSP IP/OBS MODERATE 35: CPT

## 2018-05-22 PROCEDURE — 99233 SBSQ HOSP IP/OBS HIGH 50: CPT

## 2018-05-23 PROCEDURE — 99233 SBSQ HOSP IP/OBS HIGH 50: CPT

## 2018-05-24 ENCOUNTER — APPOINTMENT (OUTPATIENT)
Dept: MRI IMAGING | Facility: HOSPITAL | Age: 68
End: 2018-05-24

## 2018-05-24 PROCEDURE — 93010 ELECTROCARDIOGRAM REPORT: CPT

## 2018-05-24 PROCEDURE — 99233 SBSQ HOSP IP/OBS HIGH 50: CPT

## 2018-05-24 PROCEDURE — 99232 SBSQ HOSP IP/OBS MODERATE 35: CPT

## 2018-05-24 PROCEDURE — 76536 US EXAM OF HEAD AND NECK: CPT | Mod: 26

## 2018-05-24 PROCEDURE — 70551 MRI BRAIN STEM W/O DYE: CPT | Mod: 26

## 2018-05-25 PROCEDURE — 99232 SBSQ HOSP IP/OBS MODERATE 35: CPT

## 2018-05-25 PROCEDURE — 71045 X-RAY EXAM CHEST 1 VIEW: CPT | Mod: 26

## 2018-05-25 PROCEDURE — 99233 SBSQ HOSP IP/OBS HIGH 50: CPT | Mod: GC

## 2018-06-07 PROCEDURE — 80053 COMPREHEN METABOLIC PANEL: CPT

## 2018-06-07 PROCEDURE — 84480 ASSAY TRIIODOTHYRONINE (T3): CPT

## 2018-06-07 PROCEDURE — 95812 EEG 41-60 MINUTES: CPT

## 2018-06-07 PROCEDURE — 80076 HEPATIC FUNCTION PANEL: CPT

## 2018-06-07 PROCEDURE — 76536 US EXAM OF HEAD AND NECK: CPT

## 2018-06-07 PROCEDURE — 84439 ASSAY OF FREE THYROXINE: CPT

## 2018-06-07 PROCEDURE — 87086 URINE CULTURE/COLONY COUNT: CPT

## 2018-06-07 PROCEDURE — 85027 COMPLETE CBC AUTOMATED: CPT

## 2018-06-07 PROCEDURE — 82550 ASSAY OF CK (CPK): CPT

## 2018-06-07 PROCEDURE — 97167 OT EVAL HIGH COMPLEX 60 MIN: CPT

## 2018-06-07 PROCEDURE — 85025 COMPLETE CBC W/AUTO DIFF WBC: CPT

## 2018-06-07 PROCEDURE — 81001 URINALYSIS AUTO W/SCOPE: CPT

## 2018-06-07 PROCEDURE — 86800 THYROGLOBULIN ANTIBODY: CPT

## 2018-06-07 PROCEDURE — 97163 PT EVAL HIGH COMPLEX 45 MIN: CPT

## 2018-06-07 PROCEDURE — 97116 GAIT TRAINING THERAPY: CPT

## 2018-06-07 PROCEDURE — 80069 RENAL FUNCTION PANEL: CPT

## 2018-06-07 PROCEDURE — 97110 THERAPEUTIC EXERCISES: CPT

## 2018-06-07 PROCEDURE — 97530 THERAPEUTIC ACTIVITIES: CPT

## 2018-06-07 PROCEDURE — 80048 BASIC METABOLIC PNL TOTAL CA: CPT

## 2018-06-07 PROCEDURE — 83735 ASSAY OF MAGNESIUM: CPT

## 2018-06-07 PROCEDURE — 70450 CT HEAD/BRAIN W/O DYE: CPT

## 2018-06-07 PROCEDURE — 92610 EVALUATE SWALLOWING FUNCTION: CPT

## 2018-06-07 PROCEDURE — 97535 SELF CARE MNGMENT TRAINING: CPT

## 2018-06-07 PROCEDURE — 84100 ASSAY OF PHOSPHORUS: CPT

## 2018-06-07 PROCEDURE — 84443 ASSAY THYROID STIM HORMONE: CPT

## 2018-06-07 PROCEDURE — 92523 SPEECH SOUND LANG COMPREHEN: CPT

## 2018-06-07 PROCEDURE — 93005 ELECTROCARDIOGRAM TRACING: CPT

## 2018-06-07 PROCEDURE — 86022 PLATELET ANTIBODIES: CPT

## 2018-06-07 PROCEDURE — 71045 X-RAY EXAM CHEST 1 VIEW: CPT

## 2018-06-07 PROCEDURE — 70551 MRI BRAIN STEM W/O DYE: CPT

## 2018-07-01 ENCOUNTER — EMERGENCY (EMERGENCY)
Facility: HOSPITAL | Age: 68
LOS: 1 days | Discharge: HOME | End: 2018-07-01
Attending: EMERGENCY MEDICINE
Payer: MEDICARE

## 2018-07-01 VITALS — WEIGHT: 160.06 LBS | TEMPERATURE: 98 F | HEIGHT: 70 IN | RESPIRATION RATE: 16 BRPM

## 2018-07-01 VITALS
HEART RATE: 58 BPM | SYSTOLIC BLOOD PRESSURE: 165 MMHG | DIASTOLIC BLOOD PRESSURE: 77 MMHG | RESPIRATION RATE: 18 BRPM | OXYGEN SATURATION: 99 %

## 2018-07-01 LAB
APTT BLD: 36.7 SEC — SIGNIFICANT CHANGE UP (ref 27.5–37.4)
BASOPHILS # BLD AUTO: 0 K/UL — SIGNIFICANT CHANGE UP (ref 0–0.2)
BASOPHILS NFR BLD AUTO: 0.2 % — SIGNIFICANT CHANGE UP (ref 0–2)
EOSINOPHIL # BLD AUTO: 0.3 K/UL — SIGNIFICANT CHANGE UP (ref 0–0.5)
EOSINOPHIL NFR BLD AUTO: 6.3 % — HIGH (ref 0–5)
GAS PNL BLDA: SIGNIFICANT CHANGE UP
HCT VFR BLD CALC: 39.8 % — LOW (ref 42–52)
HGB BLD-MCNC: 13 G/DL — LOW (ref 14–18)
INR BLD: 1.01 RATIO — SIGNIFICANT CHANGE UP (ref 0.88–1.16)
LYMPHOCYTES # BLD AUTO: 1.4 K/UL — SIGNIFICANT CHANGE UP (ref 1–4.8)
LYMPHOCYTES # BLD AUTO: 28.6 % — SIGNIFICANT CHANGE UP (ref 20–55)
MCHC RBC-ENTMCNC: 31.8 PG — HIGH (ref 27–31)
MCHC RBC-ENTMCNC: 32.7 G/DL — SIGNIFICANT CHANGE UP (ref 32–36)
MCV RBC AUTO: 97.3 FL — HIGH (ref 80–94)
MONOCYTES # BLD AUTO: 0.6 K/UL — SIGNIFICANT CHANGE UP (ref 0–0.8)
MONOCYTES NFR BLD AUTO: 12.4 % — HIGH (ref 3–10)
NEUTROPHILS # BLD AUTO: 2.5 K/UL — SIGNIFICANT CHANGE UP (ref 1.8–8)
NEUTROPHILS NFR BLD AUTO: 52.3 % — SIGNIFICANT CHANGE UP (ref 37–73)
PLATELET # BLD AUTO: 215 K/UL — SIGNIFICANT CHANGE UP (ref 150–400)
PROTHROM AB SERPL-ACNC: 11.1 SEC — SIGNIFICANT CHANGE UP (ref 9.8–12.7)
RBC # BLD: 4.09 M/UL — LOW (ref 4.6–6.2)
RBC # FLD: 12.6 % — SIGNIFICANT CHANGE UP (ref 11–15.6)
TROPONIN T SERPL-MCNC: 0.02 NG/ML — SIGNIFICANT CHANGE UP (ref 0–0.06)
TSH SERPL-MCNC: 1.21 UIU/ML — SIGNIFICANT CHANGE UP (ref 0.27–4.2)
WBC # BLD: 4.8 K/UL — SIGNIFICANT CHANGE UP (ref 4.8–10.8)
WBC # FLD AUTO: 4.8 K/UL — SIGNIFICANT CHANGE UP (ref 4.8–10.8)

## 2018-07-01 PROCEDURE — 80053 COMPREHEN METABOLIC PANEL: CPT

## 2018-07-01 PROCEDURE — 82947 ASSAY GLUCOSE BLOOD QUANT: CPT

## 2018-07-01 PROCEDURE — 84484 ASSAY OF TROPONIN QUANT: CPT

## 2018-07-01 PROCEDURE — 93010 ELECTROCARDIOGRAM REPORT: CPT

## 2018-07-01 PROCEDURE — 82803 BLOOD GASES ANY COMBINATION: CPT

## 2018-07-01 PROCEDURE — 85730 THROMBOPLASTIN TIME PARTIAL: CPT

## 2018-07-01 PROCEDURE — 99285 EMERGENCY DEPT VISIT HI MDM: CPT | Mod: 25

## 2018-07-01 PROCEDURE — 96374 THER/PROPH/DIAG INJ IV PUSH: CPT

## 2018-07-01 PROCEDURE — 71045 X-RAY EXAM CHEST 1 VIEW: CPT

## 2018-07-01 PROCEDURE — 84295 ASSAY OF SERUM SODIUM: CPT

## 2018-07-01 PROCEDURE — 82962 GLUCOSE BLOOD TEST: CPT

## 2018-07-01 PROCEDURE — 70450 CT HEAD/BRAIN W/O DYE: CPT

## 2018-07-01 PROCEDURE — 36415 COLL VENOUS BLD VENIPUNCTURE: CPT

## 2018-07-01 PROCEDURE — 84443 ASSAY THYROID STIM HORMONE: CPT

## 2018-07-01 PROCEDURE — 84132 ASSAY OF SERUM POTASSIUM: CPT

## 2018-07-01 PROCEDURE — 99285 EMERGENCY DEPT VISIT HI MDM: CPT

## 2018-07-01 PROCEDURE — 70450 CT HEAD/BRAIN W/O DYE: CPT | Mod: 26

## 2018-07-01 PROCEDURE — 85014 HEMATOCRIT: CPT

## 2018-07-01 PROCEDURE — 71045 X-RAY EXAM CHEST 1 VIEW: CPT | Mod: 26

## 2018-07-01 PROCEDURE — 82330 ASSAY OF CALCIUM: CPT

## 2018-07-01 PROCEDURE — 83605 ASSAY OF LACTIC ACID: CPT

## 2018-07-01 PROCEDURE — 93005 ELECTROCARDIOGRAM TRACING: CPT

## 2018-07-01 PROCEDURE — 82435 ASSAY OF BLOOD CHLORIDE: CPT

## 2018-07-01 PROCEDURE — 85610 PROTHROMBIN TIME: CPT

## 2018-07-01 PROCEDURE — 85027 COMPLETE CBC AUTOMATED: CPT

## 2018-07-01 RX ORDER — HYDRALAZINE HCL 50 MG
10 TABLET ORAL ONCE
Qty: 0 | Refills: 0 | Status: COMPLETED | OUTPATIENT
Start: 2018-07-01 | End: 2018-07-01

## 2018-07-01 RX ADMIN — Medication 10 MILLIGRAM(S): at 18:45

## 2018-07-01 NOTE — ED ADULT TRIAGE NOTE - CHIEF COMPLAINT QUOTE
Ambulance called after he became unresponsive. EMS reports that his initial BP was 65/39 and pt was lethargic. EMS reports raising pt's legs over head and pt mental status improved. 20g present to R FA upon arrival.  as per EMS.

## 2018-07-01 NOTE — ED PROVIDER NOTE - MEDICAL DECISION MAKING DETAILS
Frail debilitating Parkinson's pt with increasing lethargy and AMS. r/o acute traumatic pathology, infectious pathology. basic labs, blood gas, reassess.

## 2018-07-01 NOTE — ED PROVIDER NOTE - PROGRESS NOTE DETAILS
Went to reassess pt, sitting up in bed, awake alert conversive. Per wife, seems more at baseline. Workup pending pt awake alert no complaints, BP high due for some of his home meds, wife says BP always drops with changes in position she feels he is at his baseline now will recommend that he f/u with pcp tomorrow for concerns medication interactions/orthostat hypotension finer BP control will re-eval BP in 20 min

## 2018-07-01 NOTE — ED ADULT NURSE NOTE - OBJECTIVE STATEMENT
As per wife, patient went to receive his afternoon medications and became "slumped over in the chair", pt. was hypotensive.  At this time patient is hypertensive, and wife states his blood pressure goes up and down all the time.  Neuro intact, follows all commands.

## 2018-07-01 NOTE — ED PROVIDER NOTE - OBJECTIVE STATEMENT
69 y/o M pt with hx of HTN, Parkinson's presents to ED with wife c/o fatigue x few days. Symptoms sometimes subside but pt then becomes more lethargic. Denies head trauma, fever, recent started hydrochlorothiazide but no other recent changes in medication.

## 2019-07-30 ENCOUNTER — EMERGENCY (EMERGENCY)
Facility: HOSPITAL | Age: 69
LOS: 1 days | Discharge: HOME | End: 2019-07-30
Attending: EMERGENCY MEDICINE
Payer: MEDICARE

## 2019-07-30 VITALS
RESPIRATION RATE: 16 BRPM | SYSTOLIC BLOOD PRESSURE: 167 MMHG | TEMPERATURE: 99 F | DIASTOLIC BLOOD PRESSURE: 83 MMHG | OXYGEN SATURATION: 98 % | HEART RATE: 81 BPM

## 2019-07-30 LAB
ALBUMIN SERPL ELPH-MCNC: 4.7 G/DL — SIGNIFICANT CHANGE UP (ref 3.3–5.2)
ALP SERPL-CCNC: 97 U/L — SIGNIFICANT CHANGE UP (ref 40–120)
ALT FLD-CCNC: 16 U/L — SIGNIFICANT CHANGE UP
ANION GAP SERPL CALC-SCNC: 15 MMOL/L — SIGNIFICANT CHANGE UP (ref 5–17)
APPEARANCE UR: CLEAR — SIGNIFICANT CHANGE UP
AST SERPL-CCNC: 23 U/L — SIGNIFICANT CHANGE UP
BACTERIA # UR AUTO: ABNORMAL
BASOPHILS # BLD AUTO: 0.03 K/UL — SIGNIFICANT CHANGE UP (ref 0–0.2)
BASOPHILS NFR BLD AUTO: 0.5 % — SIGNIFICANT CHANGE UP (ref 0–2)
BILIRUB SERPL-MCNC: 0.7 MG/DL — SIGNIFICANT CHANGE UP (ref 0.4–2)
BILIRUB UR-MCNC: NEGATIVE — SIGNIFICANT CHANGE UP
BUN SERPL-MCNC: 15 MG/DL — SIGNIFICANT CHANGE UP (ref 8–20)
CALCIUM SERPL-MCNC: 10.1 MG/DL — SIGNIFICANT CHANGE UP (ref 8.6–10.2)
CHLORIDE SERPL-SCNC: 102 MMOL/L — SIGNIFICANT CHANGE UP (ref 98–107)
CK MB CFR SERPL CALC: 3.6 NG/ML — SIGNIFICANT CHANGE UP (ref 0–6.7)
CK SERPL-CCNC: 297 U/L — HIGH (ref 30–200)
CO2 SERPL-SCNC: 23 MMOL/L — SIGNIFICANT CHANGE UP (ref 22–29)
COLOR SPEC: YELLOW — SIGNIFICANT CHANGE UP
CREAT SERPL-MCNC: 0.88 MG/DL — SIGNIFICANT CHANGE UP (ref 0.5–1.3)
DIFF PNL FLD: ABNORMAL
EOSINOPHIL # BLD AUTO: 0.07 K/UL — SIGNIFICANT CHANGE UP (ref 0–0.5)
EOSINOPHIL NFR BLD AUTO: 1.2 % — SIGNIFICANT CHANGE UP (ref 0–6)
EPI CELLS # UR: NEGATIVE — SIGNIFICANT CHANGE UP
GLUCOSE SERPL-MCNC: 95 MG/DL — SIGNIFICANT CHANGE UP (ref 70–115)
GLUCOSE UR QL: NEGATIVE MG/DL — SIGNIFICANT CHANGE UP
HCT VFR BLD CALC: 42.4 % — SIGNIFICANT CHANGE UP (ref 39–50)
HGB BLD-MCNC: 14 G/DL — SIGNIFICANT CHANGE UP (ref 13–17)
IMM GRANULOCYTES NFR BLD AUTO: 0.2 % — SIGNIFICANT CHANGE UP (ref 0–1.5)
KETONES UR-MCNC: NEGATIVE — SIGNIFICANT CHANGE UP
LEUKOCYTE ESTERASE UR-ACNC: NEGATIVE — SIGNIFICANT CHANGE UP
LYMPHOCYTES # BLD AUTO: 1.3 K/UL — SIGNIFICANT CHANGE UP (ref 1–3.3)
LYMPHOCYTES # BLD AUTO: 22.7 % — SIGNIFICANT CHANGE UP (ref 13–44)
MAGNESIUM SERPL-MCNC: 2.4 MG/DL — SIGNIFICANT CHANGE UP (ref 1.6–2.6)
MCHC RBC-ENTMCNC: 32.6 PG — SIGNIFICANT CHANGE UP (ref 27–34)
MCHC RBC-ENTMCNC: 33 GM/DL — SIGNIFICANT CHANGE UP (ref 32–36)
MCV RBC AUTO: 98.6 FL — SIGNIFICANT CHANGE UP (ref 80–100)
MONOCYTES # BLD AUTO: 0.34 K/UL — SIGNIFICANT CHANGE UP (ref 0–0.9)
MONOCYTES NFR BLD AUTO: 5.9 % — SIGNIFICANT CHANGE UP (ref 2–14)
NEUTROPHILS # BLD AUTO: 3.97 K/UL — SIGNIFICANT CHANGE UP (ref 1.8–7.4)
NEUTROPHILS NFR BLD AUTO: 69.5 % — SIGNIFICANT CHANGE UP (ref 43–77)
NITRITE UR-MCNC: NEGATIVE — SIGNIFICANT CHANGE UP
PH UR: 7 — SIGNIFICANT CHANGE UP (ref 5–8)
PLATELET # BLD AUTO: 231 K/UL — SIGNIFICANT CHANGE UP (ref 150–400)
POTASSIUM SERPL-MCNC: 4.4 MMOL/L — SIGNIFICANT CHANGE UP (ref 3.5–5.3)
POTASSIUM SERPL-SCNC: 4.4 MMOL/L — SIGNIFICANT CHANGE UP (ref 3.5–5.3)
PROT SERPL-MCNC: 8.6 G/DL — SIGNIFICANT CHANGE UP (ref 6.6–8.7)
PROT UR-MCNC: 15 MG/DL
RBC # BLD: 4.3 M/UL — SIGNIFICANT CHANGE UP (ref 4.2–5.8)
RBC # FLD: 11.5 % — SIGNIFICANT CHANGE UP (ref 10.3–14.5)
RBC CASTS # UR COMP ASSIST: ABNORMAL /HPF (ref 0–4)
SODIUM SERPL-SCNC: 140 MMOL/L — SIGNIFICANT CHANGE UP (ref 135–145)
SP GR SPEC: 1.01 — SIGNIFICANT CHANGE UP (ref 1.01–1.02)
UROBILINOGEN FLD QL: 1 MG/DL
WBC # BLD: 5.72 K/UL — SIGNIFICANT CHANGE UP (ref 3.8–10.5)
WBC # FLD AUTO: 5.72 K/UL — SIGNIFICANT CHANGE UP (ref 3.8–10.5)
WBC UR QL: SIGNIFICANT CHANGE UP

## 2019-07-30 PROCEDURE — 82550 ASSAY OF CK (CPK): CPT

## 2019-07-30 PROCEDURE — 70450 CT HEAD/BRAIN W/O DYE: CPT | Mod: 26

## 2019-07-30 PROCEDURE — 87086 URINE CULTURE/COLONY COUNT: CPT

## 2019-07-30 PROCEDURE — 99284 EMERGENCY DEPT VISIT MOD MDM: CPT

## 2019-07-30 PROCEDURE — 81001 URINALYSIS AUTO W/SCOPE: CPT

## 2019-07-30 PROCEDURE — 93005 ELECTROCARDIOGRAM TRACING: CPT

## 2019-07-30 PROCEDURE — 36415 COLL VENOUS BLD VENIPUNCTURE: CPT

## 2019-07-30 PROCEDURE — 82553 CREATINE MB FRACTION: CPT

## 2019-07-30 PROCEDURE — 70450 CT HEAD/BRAIN W/O DYE: CPT

## 2019-07-30 PROCEDURE — 80053 COMPREHEN METABOLIC PANEL: CPT

## 2019-07-30 PROCEDURE — 85027 COMPLETE CBC AUTOMATED: CPT

## 2019-07-30 PROCEDURE — 93010 ELECTROCARDIOGRAM REPORT: CPT

## 2019-07-30 PROCEDURE — 83735 ASSAY OF MAGNESIUM: CPT

## 2019-07-30 RX ORDER — METHIMAZOLE 10 MG/1
0 TABLET ORAL
Qty: 0 | Refills: 0 | DISCHARGE

## 2019-07-30 RX ORDER — PIMAVANSERIN TARTRATE 10 MG/1
2 TABLET, COATED ORAL
Qty: 0 | Refills: 0 | DISCHARGE

## 2019-07-30 RX ORDER — FINASTERIDE 5 MG/1
1 TABLET, FILM COATED ORAL
Qty: 0 | Refills: 0 | DISCHARGE

## 2019-07-30 RX ORDER — LISINOPRIL 2.5 MG/1
0 TABLET ORAL
Qty: 0 | Refills: 0 | DISCHARGE

## 2019-07-30 RX ORDER — ALFUZOSIN HYDROCHLORIDE 10 MG/1
1 TABLET, EXTENDED RELEASE ORAL
Qty: 0 | Refills: 0 | DISCHARGE

## 2019-07-30 RX ORDER — LOSARTAN POTASSIUM 100 MG/1
1 TABLET, FILM COATED ORAL
Qty: 0 | Refills: 0 | DISCHARGE

## 2019-07-30 RX ORDER — CARBIDOPA AND LEVODOPA 25; 100 MG/1; MG/1
1 TABLET ORAL
Qty: 0 | Refills: 0 | DISCHARGE

## 2019-07-30 RX ORDER — PIMAVANSERIN TARTRATE 10 MG/1
1 TABLET, COATED ORAL
Qty: 0 | Refills: 0 | DISCHARGE

## 2019-07-30 RX ORDER — DONEPEZIL HYDROCHLORIDE 10 MG/1
1 TABLET, FILM COATED ORAL
Qty: 0 | Refills: 0 | DISCHARGE

## 2019-07-30 NOTE — ED ADULT NURSE NOTE - NSIMPLEMENTINTERV_GEN_ALL_ED
Implemented All Fall Risk Interventions:  [place] to call system. Call bell, personal items and telephone within reach. Instruct patient to call for assistance. Room bathroom lighting operational. Non-slip footwear when patient is off stretcher. Physically safe environment: no spills, clutter or unnecessary equipment. Stretcher in lowest position, wheels locked, appropriate side rails in place. Provide visual cue, wrist band, yellow gown, etc. Monitor gait and stability. Monitor for mental status changes and reorient to person, place, and time. Review medications for side effects contributing to fall risk. Reinforce activity limits and safety measures with patient and family.

## 2019-07-30 NOTE — ED PROVIDER NOTE - NEUROLOGICAL, MLM
Alert and oriented, no focal deficits, no motor or sensory deficits. + chronic parkinsonian hand tremors

## 2019-07-30 NOTE — ED ADULT NURSE NOTE - OBJECTIVE STATEMENT
As per wife pt is hallucinating and upset.  Pt states there is "trickery".  Pt with eyes closed, laying in bed, wife expressing concern. states that he has experienced this before related to his parkinson's but it has never been this bad.  Respirations are even and unlabored, no signs of distress.

## 2019-07-30 NOTE — ED PROVIDER NOTE - CLINICAL SUMMARY MEDICAL DECISION MAKING FREE TEXT BOX
pt with known halluincations for years neuro intact no sings infection or metabolic encephalopathy . wife amendable to bringing pt home as she has been caretaker for years advised to see nruologist this week for possible medication adjustment. blood work results provided . wife feels safe at home. return to ed for intractable HA, persistent vomiting, or new onset motor/sensory deficits pt denies si or hi . no hallincation in ed and pt appropiate in ed

## 2019-07-30 NOTE — ED ADULT NURSE NOTE - HOW OFTEN DO YOU HAVE A DRINK CONTAINING ALCOHOL?
Never
no loss of consciousness, no gait abnormality, no headache, no sensory deficits, and no weakness.

## 2019-07-30 NOTE — ED ADULT TRIAGE NOTE - CHIEF COMPLAINT QUOTE
landon chaidez from home as per patients wife she states that patient has been hallucinating since yesterday and has been trying to leave his house, patient alert and oriented x4 states that he has a history of parkinsons, denies any complaints of pain or discomfort and the only issue he has is "marital ones", patient bgl [id number]

## 2019-07-30 NOTE — ED PROVIDER NOTE - OBJECTIVE STATEMENT
pt presents according to wife for increased hallucinations at home "he was seeing a girlfriend that was not there" at my evlaution pt with no active hallucinations and denies fever. denies HA or neck pain. no chest pain or sob. no abd pain. no n/v/d. no urinary f/u/d. no back pain. no motor or sensory deficits. denies illicit drug use. no recent travel. no rash. no other acute issues symptoms or concerns

## 2019-07-31 LAB
CULTURE RESULTS: NO GROWTH — SIGNIFICANT CHANGE UP
SPECIMEN SOURCE: SIGNIFICANT CHANGE UP

## 2019-10-28 NOTE — PHYSICAL THERAPY INITIAL EVALUATION ADULT - IMPAIRMENTS CONTRIBUTING IMPAIRED BED MOBILITY, REHAB EVAL
Quality 130: Documentation Of Current Medications In The Medical Record: Current Medications Documented
Detail Level: Detailed
Quality 402: Tobacco Use And Help With Quitting Among Adolescents: Patient screened for tobacco and never smoked
decreased sensation/impaired coordination/impaired sensory feedback/decreased strength/impaired motor control

## 2020-02-18 NOTE — ED ADULT NURSE NOTE - DISCHARGE TEACHING
Pt reports he was moving furniture around home and began feeling SOB. Pt reports he feels like something is caught in his throat. Pt receives dialysis. Last dialysis was Saturday. Currently speaking in full sentences. Pt on room air with 100%SPO2 on room air.   
follow up pcp,
